# Patient Record
Sex: MALE | Race: WHITE | Employment: OTHER | URBAN - METROPOLITAN AREA
[De-identification: names, ages, dates, MRNs, and addresses within clinical notes are randomized per-mention and may not be internally consistent; named-entity substitution may affect disease eponyms.]

---

## 2017-01-05 ENCOUNTER — COMMUNICATION - HEALTHEAST (OUTPATIENT)
Dept: INTERNAL MEDICINE | Facility: CLINIC | Age: 72
End: 2017-01-05

## 2017-01-06 ENCOUNTER — COMMUNICATION - HEALTHEAST (OUTPATIENT)
Dept: INTERNAL MEDICINE | Facility: CLINIC | Age: 72
End: 2017-01-06

## 2017-01-12 ENCOUNTER — RECORDS - HEALTHEAST (OUTPATIENT)
Dept: ADMINISTRATIVE | Facility: OTHER | Age: 72
End: 2017-01-12

## 2017-01-20 ENCOUNTER — RECORDS - HEALTHEAST (OUTPATIENT)
Dept: ADMINISTRATIVE | Facility: OTHER | Age: 72
End: 2017-01-20

## 2017-01-26 ENCOUNTER — RECORDS - HEALTHEAST (OUTPATIENT)
Dept: ADMINISTRATIVE | Facility: OTHER | Age: 72
End: 2017-01-26

## 2017-02-03 ENCOUNTER — OFFICE VISIT - HEALTHEAST (OUTPATIENT)
Dept: INTERNAL MEDICINE | Facility: CLINIC | Age: 72
End: 2017-02-03

## 2017-02-03 ENCOUNTER — COMMUNICATION - HEALTHEAST (OUTPATIENT)
Dept: INTERNAL MEDICINE | Facility: CLINIC | Age: 72
End: 2017-02-03

## 2017-02-03 DIAGNOSIS — C71.9 MALIGNANT NEOPLASM OF BRAIN, UNSPECIFIED LOCATION (H): ICD-10-CM

## 2017-02-03 DIAGNOSIS — N40.1 BPH (BENIGN PROSTATIC HYPERTROPHY) WITH URINARY OBSTRUCTION: ICD-10-CM

## 2017-02-03 DIAGNOSIS — G40.909 EPILEPSY (H): ICD-10-CM

## 2017-02-03 DIAGNOSIS — N17.1 ACUTE RENAL FAILURE WITH ACUTE CORTICAL NECROSIS (H): ICD-10-CM

## 2017-02-03 DIAGNOSIS — R73.09 OTHER ABNORMAL GLUCOSE: ICD-10-CM

## 2017-02-03 DIAGNOSIS — J98.59 MEDIASTINAL MASS: ICD-10-CM

## 2017-02-03 DIAGNOSIS — I10 ESSENTIAL HYPERTENSION WITH GOAL BLOOD PRESSURE LESS THAN 140/90: ICD-10-CM

## 2017-02-03 DIAGNOSIS — J44.89 CHRONIC AIRWAY OBSTRUCTION, NOT ELSEWHERE CLASSIFIED: ICD-10-CM

## 2017-02-03 DIAGNOSIS — I87.2 VENOUS INSUFFICIENCY: ICD-10-CM

## 2017-02-03 DIAGNOSIS — N13.8 BPH (BENIGN PROSTATIC HYPERTROPHY) WITH URINARY OBSTRUCTION: ICD-10-CM

## 2017-02-03 DIAGNOSIS — C34.91 NON-SMALL CELL LUNG CANCER, RIGHT (H): ICD-10-CM

## 2017-02-03 ASSESSMENT — MIFFLIN-ST. JEOR: SCORE: 1593.09

## 2017-02-10 ENCOUNTER — RECORDS - HEALTHEAST (OUTPATIENT)
Dept: ADMINISTRATIVE | Facility: OTHER | Age: 72
End: 2017-02-10

## 2017-02-13 ENCOUNTER — RECORDS - HEALTHEAST (OUTPATIENT)
Dept: ADMINISTRATIVE | Facility: OTHER | Age: 72
End: 2017-02-13

## 2017-02-20 ENCOUNTER — RECORDS - HEALTHEAST (OUTPATIENT)
Dept: ADMINISTRATIVE | Facility: OTHER | Age: 72
End: 2017-02-20

## 2017-02-22 ENCOUNTER — RECORDS - HEALTHEAST (OUTPATIENT)
Dept: ADMINISTRATIVE | Facility: OTHER | Age: 72
End: 2017-02-22

## 2017-03-01 ENCOUNTER — RECORDS - HEALTHEAST (OUTPATIENT)
Dept: ADMINISTRATIVE | Facility: OTHER | Age: 72
End: 2017-03-01

## 2017-03-02 ENCOUNTER — RECORDS - HEALTHEAST (OUTPATIENT)
Dept: ADMINISTRATIVE | Facility: OTHER | Age: 72
End: 2017-03-02

## 2017-03-08 ENCOUNTER — RECORDS - HEALTHEAST (OUTPATIENT)
Dept: ADMINISTRATIVE | Facility: OTHER | Age: 72
End: 2017-03-08

## 2017-05-20 ENCOUNTER — RECORDS - HEALTHEAST (OUTPATIENT)
Dept: ADMINISTRATIVE | Facility: OTHER | Age: 72
End: 2017-05-20

## 2017-05-31 ENCOUNTER — COMMUNICATION - HEALTHEAST (OUTPATIENT)
Dept: INTERNAL MEDICINE | Facility: CLINIC | Age: 72
End: 2017-05-31

## 2017-06-01 ENCOUNTER — OFFICE VISIT - HEALTHEAST (OUTPATIENT)
Dept: INTERNAL MEDICINE | Facility: CLINIC | Age: 72
End: 2017-06-01

## 2017-06-01 DIAGNOSIS — T63.301A SPIDER BITE: ICD-10-CM

## 2017-06-01 ASSESSMENT — MIFFLIN-ST. JEOR: SCORE: 1597.62

## 2017-06-07 ENCOUNTER — RECORDS - HEALTHEAST (OUTPATIENT)
Dept: ADMINISTRATIVE | Facility: OTHER | Age: 72
End: 2017-06-07

## 2017-06-22 ENCOUNTER — COMMUNICATION - HEALTHEAST (OUTPATIENT)
Dept: INTERNAL MEDICINE | Facility: CLINIC | Age: 72
End: 2017-06-22

## 2017-06-26 ENCOUNTER — RECORDS - HEALTHEAST (OUTPATIENT)
Dept: ADMINISTRATIVE | Facility: OTHER | Age: 72
End: 2017-06-26

## 2017-07-30 ENCOUNTER — RECORDS - HEALTHEAST (OUTPATIENT)
Dept: ADMINISTRATIVE | Facility: OTHER | Age: 72
End: 2017-07-30

## 2017-08-10 ENCOUNTER — RECORDS - HEALTHEAST (OUTPATIENT)
Dept: ADMINISTRATIVE | Facility: OTHER | Age: 72
End: 2017-08-10

## 2017-10-18 ENCOUNTER — RECORDS - HEALTHEAST (OUTPATIENT)
Dept: ADMINISTRATIVE | Facility: OTHER | Age: 72
End: 2017-10-18

## 2017-10-25 ENCOUNTER — RECORDS - HEALTHEAST (OUTPATIENT)
Dept: ADMINISTRATIVE | Facility: OTHER | Age: 72
End: 2017-10-25

## 2017-10-26 ENCOUNTER — RECORDS - HEALTHEAST (OUTPATIENT)
Dept: ADMINISTRATIVE | Facility: OTHER | Age: 72
End: 2017-10-26

## 2017-10-31 ENCOUNTER — RECORDS - HEALTHEAST (OUTPATIENT)
Dept: ADMINISTRATIVE | Facility: OTHER | Age: 72
End: 2017-10-31

## 2018-01-30 ENCOUNTER — OFFICE VISIT - HEALTHEAST (OUTPATIENT)
Dept: INTERNAL MEDICINE | Facility: CLINIC | Age: 73
End: 2018-01-30

## 2018-01-30 DIAGNOSIS — N62 GYNECOMASTIA, MALE: ICD-10-CM

## 2018-01-30 ASSESSMENT — MIFFLIN-ST. JEOR: SCORE: 1597.62

## 2018-02-06 ENCOUNTER — COMMUNICATION - HEALTHEAST (OUTPATIENT)
Dept: INTERNAL MEDICINE | Facility: CLINIC | Age: 73
End: 2018-02-06

## 2018-02-06 ENCOUNTER — COMMUNICATION - HEALTHEAST (OUTPATIENT)
Dept: SCHEDULING | Facility: CLINIC | Age: 73
End: 2018-02-06

## 2018-03-28 ENCOUNTER — RECORDS - HEALTHEAST (OUTPATIENT)
Dept: ADMINISTRATIVE | Facility: OTHER | Age: 73
End: 2018-03-28

## 2018-08-06 ENCOUNTER — COMMUNICATION - HEALTHEAST (OUTPATIENT)
Dept: INTERNAL MEDICINE | Facility: CLINIC | Age: 73
End: 2018-08-06

## 2018-08-13 ENCOUNTER — COMMUNICATION - HEALTHEAST (OUTPATIENT)
Dept: INTERNAL MEDICINE | Facility: CLINIC | Age: 73
End: 2018-08-13

## 2018-09-05 ENCOUNTER — RECORDS - HEALTHEAST (OUTPATIENT)
Dept: ADMINISTRATIVE | Facility: OTHER | Age: 73
End: 2018-09-05

## 2018-09-12 ENCOUNTER — RECORDS - HEALTHEAST (OUTPATIENT)
Dept: ADMINISTRATIVE | Facility: OTHER | Age: 73
End: 2018-09-12

## 2018-10-30 ENCOUNTER — RECORDS - HEALTHEAST (OUTPATIENT)
Dept: ADMINISTRATIVE | Facility: OTHER | Age: 73
End: 2018-10-30

## 2019-02-21 ENCOUNTER — RECORDS - HEALTHEAST (OUTPATIENT)
Dept: ADMINISTRATIVE | Facility: OTHER | Age: 74
End: 2019-02-21

## 2019-03-13 ENCOUNTER — RECORDS - HEALTHEAST (OUTPATIENT)
Dept: ADMINISTRATIVE | Facility: OTHER | Age: 74
End: 2019-03-13

## 2019-04-29 ENCOUNTER — RECORDS - HEALTHEAST (OUTPATIENT)
Dept: ADMINISTRATIVE | Facility: OTHER | Age: 74
End: 2019-04-29

## 2019-05-07 ENCOUNTER — RECORDS - HEALTHEAST (OUTPATIENT)
Dept: ADMINISTRATIVE | Facility: OTHER | Age: 74
End: 2019-05-07

## 2019-06-03 ENCOUNTER — RECORDS - HEALTHEAST (OUTPATIENT)
Dept: ADMINISTRATIVE | Facility: OTHER | Age: 74
End: 2019-06-03

## 2019-06-03 ENCOUNTER — OFFICE VISIT - HEALTHEAST (OUTPATIENT)
Dept: INTERNAL MEDICINE | Facility: CLINIC | Age: 74
End: 2019-06-03

## 2019-06-03 ENCOUNTER — COMMUNICATION - HEALTHEAST (OUTPATIENT)
Dept: INTERNAL MEDICINE | Facility: CLINIC | Age: 74
End: 2019-06-03

## 2019-06-03 DIAGNOSIS — Z12.5 SCREENING FOR PROSTATE CANCER: ICD-10-CM

## 2019-06-03 DIAGNOSIS — Z00.00 ROUTINE GENERAL MEDICAL EXAMINATION AT A HEALTH CARE FACILITY: ICD-10-CM

## 2019-06-03 DIAGNOSIS — M85.9 DISORDER OF BONE DENSITY AND STRUCTURE, UNSPECIFIED: ICD-10-CM

## 2019-06-03 DIAGNOSIS — E83.52 HYPERCALCEMIA: ICD-10-CM

## 2019-06-03 DIAGNOSIS — M89.9 DISORDER OF BONE: ICD-10-CM

## 2019-06-03 DIAGNOSIS — E78.00 PURE HYPERCHOLESTEROLEMIA: ICD-10-CM

## 2019-06-03 DIAGNOSIS — C34.91 NON-SMALL CELL LUNG CANCER, RIGHT (H): ICD-10-CM

## 2019-06-03 DIAGNOSIS — J40 BRONCHITIS, NOT SPECIFIED AS ACUTE OR CHRONIC: ICD-10-CM

## 2019-06-03 DIAGNOSIS — J44.1 OBSTRUCTIVE CHRONIC BRONCHITIS WITH EXACERBATION (H): ICD-10-CM

## 2019-06-03 DIAGNOSIS — E78.00 HYPERCHOLESTEREMIA: ICD-10-CM

## 2019-06-03 DIAGNOSIS — M85.80 OSTEOPENIA, UNSPECIFIED LOCATION: ICD-10-CM

## 2019-06-03 LAB
ALBUMIN SERPL-MCNC: 4.4 G/DL (ref 3.5–5)
ALP SERPL-CCNC: 56 U/L (ref 45–120)
ALT SERPL W P-5'-P-CCNC: 21 U/L (ref 0–45)
ANION GAP SERPL CALCULATED.3IONS-SCNC: 10 MMOL/L (ref 5–18)
AST SERPL W P-5'-P-CCNC: 19 U/L (ref 0–40)
BILIRUB SERPL-MCNC: 0.4 MG/DL (ref 0–1)
BUN SERPL-MCNC: 17 MG/DL (ref 8–28)
CALCIUM SERPL-MCNC: 9.8 MG/DL (ref 8.5–10.5)
CHLORIDE BLD-SCNC: 101 MMOL/L (ref 98–107)
CHOLEST SERPL-MCNC: 191 MG/DL
CO2 SERPL-SCNC: 29 MMOL/L (ref 22–31)
CREAT SERPL-MCNC: 0.85 MG/DL (ref 0.7–1.3)
ERYTHROCYTE [DISTWIDTH] IN BLOOD BY AUTOMATED COUNT: 13.1 % (ref 11–14.5)
FASTING STATUS PATIENT QL REPORTED: YES
GFR SERPL CREATININE-BSD FRML MDRD: >60 ML/MIN/1.73M2
GLUCOSE BLD-MCNC: 98 MG/DL (ref 70–125)
HCT VFR BLD AUTO: 47.6 % (ref 40–54)
HDLC SERPL-MCNC: 65 MG/DL
HGB BLD-MCNC: 15.4 G/DL (ref 14–18)
LDLC SERPL CALC-MCNC: 100 MG/DL
MCH RBC QN AUTO: 26.5 PG (ref 27–34)
MCHC RBC AUTO-ENTMCNC: 32.3 G/DL (ref 32–36)
MCV RBC AUTO: 82 FL (ref 80–100)
PLATELET # BLD AUTO: 275 THOU/UL (ref 140–440)
PMV BLD AUTO: 9.1 FL (ref 7–10)
POTASSIUM BLD-SCNC: 4.2 MMOL/L (ref 3.5–5)
PROT SERPL-MCNC: 7.3 G/DL (ref 6–8)
PSA SERPL-MCNC: 0.8 NG/ML (ref 0–6.5)
RBC # BLD AUTO: 5.79 MILL/UL (ref 4.4–6.2)
SODIUM SERPL-SCNC: 140 MMOL/L (ref 136–145)
TRIGL SERPL-MCNC: 132 MG/DL
WBC: 8.4 THOU/UL (ref 4–11)

## 2019-06-03 ASSESSMENT — MIFFLIN-ST. JEOR: SCORE: 1584.2

## 2019-06-12 ENCOUNTER — RECORDS - HEALTHEAST (OUTPATIENT)
Dept: ADMINISTRATIVE | Facility: OTHER | Age: 74
End: 2019-06-12

## 2019-06-12 ENCOUNTER — RECORDS - HEALTHEAST (OUTPATIENT)
Dept: BONE DENSITY | Facility: CLINIC | Age: 74
End: 2019-06-12

## 2019-06-12 DIAGNOSIS — M85.9 DISORDER OF BONE DENSITY AND STRUCTURE, UNSPECIFIED: ICD-10-CM

## 2019-06-12 DIAGNOSIS — M89.9 DISORDER OF BONE, UNSPECIFIED: ICD-10-CM

## 2019-06-12 DIAGNOSIS — M85.80 OTHER SPECIFIED DISORDERS OF BONE DENSITY AND STRUCTURE, UNSPECIFIED SITE: ICD-10-CM

## 2019-06-17 ENCOUNTER — RECORDS - HEALTHEAST (OUTPATIENT)
Dept: ADMINISTRATIVE | Facility: OTHER | Age: 74
End: 2019-06-17

## 2019-07-01 ENCOUNTER — AMBULATORY - HEALTHEAST (OUTPATIENT)
Dept: INTERNAL MEDICINE | Facility: CLINIC | Age: 74
End: 2019-07-01

## 2019-07-01 ENCOUNTER — COMMUNICATION - HEALTHEAST (OUTPATIENT)
Dept: INTERNAL MEDICINE | Facility: CLINIC | Age: 74
End: 2019-07-01

## 2019-07-01 DIAGNOSIS — M81.0 AGE-RELATED OSTEOPOROSIS WITHOUT CURRENT PATHOLOGICAL FRACTURE: ICD-10-CM

## 2019-07-10 ENCOUNTER — RECORDS - HEALTHEAST (OUTPATIENT)
Dept: ADMINISTRATIVE | Facility: OTHER | Age: 74
End: 2019-07-10

## 2019-07-10 LAB — COLOGUARD-ABSTRACT: NEGATIVE

## 2019-07-14 ENCOUNTER — RECORDS - HEALTHEAST (OUTPATIENT)
Dept: ADMINISTRATIVE | Facility: OTHER | Age: 74
End: 2019-07-14

## 2019-07-16 ENCOUNTER — RECORDS - HEALTHEAST (OUTPATIENT)
Dept: ADMINISTRATIVE | Facility: OTHER | Age: 74
End: 2019-07-16

## 2019-07-25 ENCOUNTER — RECORDS - HEALTHEAST (OUTPATIENT)
Dept: HEALTH INFORMATION MANAGEMENT | Facility: CLINIC | Age: 74
End: 2019-07-25

## 2019-07-25 ENCOUNTER — COMMUNICATION - HEALTHEAST (OUTPATIENT)
Dept: INTERNAL MEDICINE | Facility: CLINIC | Age: 74
End: 2019-07-25

## 2019-08-07 ENCOUNTER — RECORDS - HEALTHEAST (OUTPATIENT)
Dept: ADMINISTRATIVE | Facility: OTHER | Age: 74
End: 2019-08-07

## 2019-08-08 ENCOUNTER — TRANSFERRED RECORDS (OUTPATIENT)
Dept: HEALTH INFORMATION MANAGEMENT | Facility: CLINIC | Age: 74
End: 2019-08-08

## 2019-09-17 ENCOUNTER — RECORDS - HEALTHEAST (OUTPATIENT)
Dept: ADMINISTRATIVE | Facility: OTHER | Age: 74
End: 2019-09-17

## 2019-09-25 ENCOUNTER — RECORDS - HEALTHEAST (OUTPATIENT)
Dept: ADMINISTRATIVE | Facility: OTHER | Age: 74
End: 2019-09-25

## 2019-11-19 ENCOUNTER — OFFICE VISIT - HEALTHEAST (OUTPATIENT)
Dept: INTERNAL MEDICINE | Facility: CLINIC | Age: 74
End: 2019-11-19

## 2019-11-19 DIAGNOSIS — I10 ESSENTIAL HYPERTENSION: ICD-10-CM

## 2019-11-19 DIAGNOSIS — C71.5: ICD-10-CM

## 2019-11-19 DIAGNOSIS — Z00.00 ROUTINE GENERAL MEDICAL EXAMINATION AT A HEALTH CARE FACILITY: ICD-10-CM

## 2019-11-19 DIAGNOSIS — M85.80 OSTEOPENIA, UNSPECIFIED LOCATION: ICD-10-CM

## 2019-11-19 DIAGNOSIS — Z79.899 MEDICATION MANAGEMENT: ICD-10-CM

## 2019-11-19 DIAGNOSIS — J44.1 OBSTRUCTIVE CHRONIC BRONCHITIS WITH EXACERBATION (H): ICD-10-CM

## 2019-11-19 DIAGNOSIS — M81.0 AGE-RELATED OSTEOPOROSIS WITHOUT CURRENT PATHOLOGICAL FRACTURE: ICD-10-CM

## 2019-11-19 DIAGNOSIS — C34.91 NON-SMALL CELL LUNG CANCER, RIGHT (H): ICD-10-CM

## 2019-11-19 DIAGNOSIS — M85.9 DISORDER OF BONE DENSITY AND STRUCTURE, UNSPECIFIED: ICD-10-CM

## 2019-11-19 DIAGNOSIS — E78.00 PURE HYPERCHOLESTEROLEMIA: ICD-10-CM

## 2019-11-19 LAB
ALBUMIN SERPL-MCNC: 3.9 G/DL (ref 3.5–5)
ALP SERPL-CCNC: 49 U/L (ref 45–120)
ALT SERPL W P-5'-P-CCNC: 21 U/L (ref 0–45)
ANION GAP SERPL CALCULATED.3IONS-SCNC: 8 MMOL/L (ref 5–18)
AST SERPL W P-5'-P-CCNC: 17 U/L (ref 0–40)
BILIRUB SERPL-MCNC: 0.3 MG/DL (ref 0–1)
BUN SERPL-MCNC: 25 MG/DL (ref 8–28)
CALCIUM SERPL-MCNC: 9.1 MG/DL (ref 8.5–10.5)
CARBAMAZEPINE SERPL-MCNC: 7.1 UG/ML (ref 4–12)
CHLORIDE BLD-SCNC: 101 MMOL/L (ref 98–107)
CHOLEST SERPL-MCNC: 170 MG/DL
CO2 SERPL-SCNC: 31 MMOL/L (ref 22–31)
CREAT SERPL-MCNC: 0.94 MG/DL (ref 0.7–1.3)
ERYTHROCYTE [DISTWIDTH] IN BLOOD BY AUTOMATED COUNT: 12.7 % (ref 11–14.5)
FASTING STATUS PATIENT QL REPORTED: NO
GFR SERPL CREATININE-BSD FRML MDRD: >60 ML/MIN/1.73M2
GLUCOSE BLD-MCNC: 102 MG/DL (ref 70–125)
HCT VFR BLD AUTO: 45.1 % (ref 40–54)
HDLC SERPL-MCNC: 57 MG/DL
HGB BLD-MCNC: 14.7 G/DL (ref 14–18)
LDLC SERPL CALC-MCNC: 83 MG/DL
MCH RBC QN AUTO: 28.5 PG (ref 27–34)
MCHC RBC AUTO-ENTMCNC: 32.5 G/DL (ref 32–36)
MCV RBC AUTO: 88 FL (ref 80–100)
PLATELET # BLD AUTO: 300 THOU/UL (ref 140–440)
PMV BLD AUTO: 8.5 FL (ref 7–10)
POTASSIUM BLD-SCNC: 4.8 MMOL/L (ref 3.5–5)
PROT SERPL-MCNC: 6.9 G/DL (ref 6–8)
RBC # BLD AUTO: 5.14 MILL/UL (ref 4.4–6.2)
SODIUM SERPL-SCNC: 140 MMOL/L (ref 136–145)
TRIGL SERPL-MCNC: 148 MG/DL
WBC: 8.6 THOU/UL (ref 4–11)

## 2019-11-19 ASSESSMENT — MIFFLIN-ST. JEOR: SCORE: 1593.09

## 2019-11-21 ENCOUNTER — COMMUNICATION - HEALTHEAST (OUTPATIENT)
Dept: INTERNAL MEDICINE | Facility: CLINIC | Age: 74
End: 2019-11-21

## 2020-02-10 ENCOUNTER — RECORDS - HEALTHEAST (OUTPATIENT)
Dept: ADMINISTRATIVE | Facility: OTHER | Age: 75
End: 2020-02-10

## 2020-09-25 ENCOUNTER — RECORDS - HEALTHEAST (OUTPATIENT)
Dept: ADMINISTRATIVE | Facility: OTHER | Age: 75
End: 2020-09-25

## 2021-01-07 ENCOUNTER — TRANSFERRED RECORDS (OUTPATIENT)
Dept: HEALTH INFORMATION MANAGEMENT | Facility: CLINIC | Age: 76
End: 2021-01-07

## 2021-01-08 ENCOUNTER — TRANSFERRED RECORDS (OUTPATIENT)
Dept: HEALTH INFORMATION MANAGEMENT | Facility: CLINIC | Age: 76
End: 2021-01-08

## 2021-01-08 ENCOUNTER — MEDICAL CORRESPONDENCE (OUTPATIENT)
Dept: HEALTH INFORMATION MANAGEMENT | Facility: CLINIC | Age: 76
End: 2021-01-08

## 2021-01-11 ENCOUNTER — TRANSCRIBE ORDERS (OUTPATIENT)
Dept: OTHER | Age: 76
End: 2021-01-11

## 2021-01-11 DIAGNOSIS — D32.0 CEREBRAL MENINGIOMA (H): Primary | ICD-10-CM

## 2021-01-12 ENCOUNTER — TELEPHONE (OUTPATIENT)
Dept: OPHTHALMOLOGY | Facility: CLINIC | Age: 76
End: 2021-01-12

## 2021-01-12 NOTE — TELEPHONE ENCOUNTER
Spoke to pt/wife at 0900  No new/sudden vision changes    MRI done at [place] couple weeks ago.    Scheduled with Dr. Rodriguez first available 2-2-2021 at [facility] location     Pt/aware of date/time/location at [facility]    Note to facilitator    Kamaljit Camara RN 9:20 AM 01/13/21        Nadeem Rosas [phone]      Left message with direct number at [phone]    Kamaljit Camara RN 3:55 PM 01/12/21        [facility]    Phone Message    May a detailed message be left on voicemail: yes     Reason for Call: Appointment Intake    Referring Provider Name: Lucille Solis NP at [facility]   Diagnosis and/or Symptoms: Cerebral Meningioma     Records in care everywhere, please review and contact patient with appointment options, Dx not on scheduling protocols    Action Taken: Message routed to:  [facility]    Travel Screening: Not Applicable

## 2021-02-02 ENCOUNTER — OFFICE VISIT (OUTPATIENT)
Dept: OPHTHALMOLOGY | Facility: CLINIC | Age: 76
End: 2021-02-02
Attending: OPHTHALMOLOGY
Payer: MEDICARE

## 2021-02-02 DIAGNOSIS — H53.40 VISUAL FIELD DEFECT: Primary | ICD-10-CM

## 2021-02-02 DIAGNOSIS — D32.9 MENINGIOMA (H): ICD-10-CM

## 2021-02-02 DIAGNOSIS — H53.40 VISUAL FIELD DEFECT: ICD-10-CM

## 2021-02-02 DIAGNOSIS — H46.9 OPTIC NEUROPATHY: Primary | ICD-10-CM

## 2021-02-02 PROCEDURE — 92083 EXTENDED VISUAL FIELD XM: CPT | Performed by: OPHTHALMOLOGY

## 2021-02-02 PROCEDURE — 99204 OFFICE O/P NEW MOD 45 MIN: CPT | Mod: GC | Performed by: OPHTHALMOLOGY

## 2021-02-02 PROCEDURE — G0463 HOSPITAL OUTPT CLINIC VISIT: HCPCS | Performed by: TECHNICIAN/TECHNOLOGIST

## 2021-02-02 PROCEDURE — 92133 CPTRZD OPH DX IMG PST SGM ON: CPT | Performed by: OPHTHALMOLOGY

## 2021-02-02 ASSESSMENT — REFRACTION_WEARINGRX
SPECS_TYPE: BIFOCAL
OS_ADD: +2.50
OD_CYLINDER: +1.50
OD_SPHERE: -1.25
OS_SPHERE: -0.75
OD_ADD: +2.50
OS_CYLINDER: +1.25
OS_AXIS: 150
OD_AXIS: 028

## 2021-02-02 ASSESSMENT — VISUAL ACUITY
OD_CC+: -2
METHOD: SNELLEN - LINEAR
OD_CC: 20/25
CORRECTION_TYPE: GLASSES
OS_CC: 20/25
OS_CC+: -3

## 2021-02-02 ASSESSMENT — EXTERNAL EXAM - LEFT EYE: OS_EXAM: NORMAL

## 2021-02-02 ASSESSMENT — CONF VISUAL FIELD
OS_INFERIOR_TEMPORAL_RESTRICTION: 3
METHOD: COUNTING FINGERS
OD_NORMAL: 1

## 2021-02-02 ASSESSMENT — TONOMETRY
IOP_METHOD: ICARE
OS_IOP_MMHG: 20
OD_IOP_MMHG: 20

## 2021-02-02 ASSESSMENT — SLIT LAMP EXAM - LIDS
COMMENTS: NORMAL
COMMENTS: NORMAL

## 2021-02-02 ASSESSMENT — EXTERNAL EXAM - RIGHT EYE: OD_EXAM: NORMAL

## 2021-02-02 NOTE — LETTER
2021         RE:  :  MRN: Nadeem Rosas  1945  [id number]     Dear Dr. Nicole,    Thank you for asking me to see your very pleasant patient, Nadeem Rosas, in neuro-ophthalmic consultation.  I would like to thank you for sending your records and I have summarized them in the history of present illness. He presented with his spouse who provided additional history.  My assessment and plan are below.  For further details, please see my attached clinic note.      Assessment & Plan     Nadeem Rosas is a 75 year old male with the following diagnoses:   1. Optic neuropathy    2. Visual field defect    3. Meningioma (H)         Patient was sent for consultation by Dr. Brenda Nicole for meningioma.       Nadeem Rosas is a 75 year old male with a history of posterior fossa ependymoma s/p resection by Dr. 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 He was admitted for left submandiular infection associated with dental abscess at [facility]. MRI brain was obtained which incidentally noted a right paraclinoid meningioma. His most recent MRI brain w/wo contrast on 21 showed interval increase in size of the meningioma.    The patient reports that he has not noticed any problems with his vision over the past year. He is happy with his current pair of glasses. He denies any headaches, nausea, vomiting.     He has chronic neck pain with parathesias that radiates into both arms (L > R). He takes Tylenol often for the pain which helps.       POH: CEIOL OU ()  Negative for glaucoma or macular degeneration     PMH: posterior fossa ependymoma s/p resection by Dr. Schwab in   Posterior circulation stroke  with chronic inferotemporal quadrantanopia  Adenocarcinoma of the lung s/p primary resection followed by local  radiation 3/2016, repeat radiation in 12/2020 for recurrence  Hypertension on losartan  Hypercholesterolemia  Seizures - takes carbamazepine     PSH:   ependymoma resection  Left lung decortication in 1995    Negative for diabetes     SHx: former smoker (quit >25 years ago). No alcohol use. No illicit drug use.     MRI 1/7/21   1.  Mild interval increase in size of the right planum sphenoidale/tuberculum sellae meningioma.  2.  Punctate focus of enhancement in the lateral right cerebellar folia nonspecific is slightly more conspicuous on the 07/14/2019 MRI. Recommend continued attention on follow-up.  3.  Nodular enhancement in the right posterior fossa lateral adjacent to the medulla is unchanged dating back to 07/14/2019 and favored to represent asymmetric choroid plexus.    Visual acuity 20/25 both eyes.  Pupils with trace afferent pupillary defect right eye.  Color vision normal both eyes.  Posterior chamber intraocular lens (PCIOL) in both eyes.  Fundus exam with right+ pallor RIGHT eye and temporal pallor left eye.      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  I reviewed his MRI images personally and there is clear compression of the right optic nerve.  He has evidence of an optic neuropathy RIGHT eye with a right afferent pupillary defect, right retinal nerve fiber layer thinning, and right ganglion cell layer loss.  I will let Dr. Nicole know so that she can discuss radiation therapy with him.  Follow up 1 year sooner as needed for worsening symptoms.     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            Again, thank you for allowing me to participate in the care of your patient.      Sincerely,    Americo Rodriguez MD  Professor  Ophthalmology Residency   Director of Neuro-Ophthalmology  [employer] [facility] [employer] Endowed Chair  Departments of Ophthalmology, Neurology, and  Neurosurgery  [facility] [address] [place]  [address]  T - [phone]  F - [phone]  KISHA Chavez [email or web address]      CC: Brenda Nicole MD  [facility]  [address] [place] [address]  Via Fax: [phone]     Milan Whipple MD  [facility]  [address] [address]  Via Outside Provider Messaging    DX = optic atrophy, meningioma, homonymous hemianopia

## 2021-02-02 NOTE — PROGRESS NOTES
Assessment & Plan     Nadeem Rosas is a 75 year old male with the following diagnoses:   1. Optic neuropathy    2. Visual field defect    3. Meningioma (H)         Patient was sent for consultation by Dr. Brenda Nicole for meningioma.       Nadeem Rosas is a 75 year old male with a history of posterior fossa ependymoma s/p resection by Dr. Schwab (1991), posterior circulation stroke (1991) with chronic inferotemporal quadrantanopia, adenocarcinoma of the lung s/p primary resection followed by local radiation 3/2016, repeat radiation in 12/2020 for recurrence. He was admitted for left submandiular infection associated with dental abscess at [facility]. MRI brain was obtained which incidentally noted a right paraclinoid meningioma. His most recent MRI brain w/wo contrast on 1/7/21 showed interval increase in size of the meningioma.    The patient reports that he has not noticed any problems with his vision over the past year. He is happy with his current pair of glasses. He denies any headaches, nausea, vomiting.     He has chronic neck pain with parathesias that radiates into both arms (L > R). He takes Tylenol often for the pain which helps.       POH: CEIOL OU (2012)  Negative for glaucoma or macular degeneration     PMH: posterior fossa ependymoma s/p resection by Dr. Schwab in 1991  Posterior circulation stroke 1991 with chronic inferotemporal quadrantanopia  Adenocarcinoma of the lung s/p primary resection followed by local radiation 3/2016, repeat radiation in 12/2020 for recurrence  Hypertension on losartan  Hypercholesterolemia  Seizures - takes carbamazepine     PSH:   ependymoma resection  Left lung decortication in 1995    Negative for diabetes     SHx: former smoker (quit >25 years ago). No alcohol use. No illicit drug use.     MRI 1/7/21   1.  Mild interval increase in size of the right planum sphenoidale/tuberculum sellae meningioma.  2.  Punctate focus of enhancement in the  lateral right cerebellar folia nonspecific is slightly more conspicuous on the 07/14/2019 MRI. Recommend continued attention on follow-up.  3.  Nodular enhancement in the right posterior fossa lateral adjacent to the medulla is unchanged dating back to 07/14/2019 and favored to represent asymmetric choroid plexus.    Visual acuity 20/25 both eyes.  Pupils with trace afferent pupillary defect right eye.  Color vision normal both eyes.  Posterior chamber intraocular lens (PCIOL) in both eyes.  Fundus exam with right+ pallor RIGHT eye and temporal pallor left eye.      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  I reviewed his MRI images personally and there is clear compression of the right optic nerve.  He has evidence of an optic neuropathy RIGHT eye with a right afferent pupillary defect, right retinal nerve fiber layer thinning, and right ganglion cell layer loss.  I will let Dr. Nicole know so that she can discuss radiation therapy with him.  Follow up 1 year sooner as needed for worsening symptoms.     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              Attending Physician Attestation:  Complete documentation of historical and exam elements from today's encounter can be found in the full encounter summary report (not reduplicated in this progress note).  I personally obtained the chief complaint(s) and history of present illness.  I confirmed and edited as necessary the review of systems, past medical/surgical history, family history, social history, and examination findings as documented by others; and I examined the patient myself.  I personally reviewed the relevant tests, images, and reports as documented above.  I formulated and edited as necessary the assessment and plan and discussed the findings and management plan with the patient and family. I personally reviewed the ophthalmic test(s)  associated with this encounter, agree with the interpretation(s) as documented by the resident/fellow, and have edited the corresponding report(s) as necessary.  - Americo Welch MD  Ophthalmology PGY-3  [facility]

## 2021-02-02 NOTE — Clinical Note
2/2/2021       RE: Nadeem Rosas  [address]  [facility] [address]     Dear Colleague,    Thank you for referring your patient, Nadeem Rosas, to the [facility] at [facility]. Please see a copy of my visit note below.         Assessment & Plan     Nadeem Rosas is a 75 year old male with the following diagnoses:   1. Optic neuropathy    2. Visual field defect    3. Meningioma (H)         Patient was sent for consultation by Dr. Brenda Nicole for meningioma.       Nadeem Rosas is a 75 year old male with a history of posterior fossa ependymoma s/p resection by Dr. Schwab (1991), posterior circulation stroke (1991) with chronic inferotemporal quadrantanopia, adenocarcinoma of the lung s/p primary resection followed by local radiation 3/2016, repeat radiation in 12/2020 for recurrence. He was admitted for left submandiular infection associated with dental abscess at [facility]. MRI brain was obtained which incidentally noted a right paraclinoid meningioma. His most recent MRI brain w/wo contrast on 1/7/21 showed interval increase in size of the meningioma.    The patient reports that he has not noticed any problems with his vision over the past year. He is happy with his current pair of glasses. He denies any headaches, nausea, vomiting.     He has chronic neck pain with parathesias that radiates into both arms (L > R). He takes Tylenol often for the pain which helps.       POH: CEIOL OU (2012)  Negative for glaucoma or macular degeneration     PMH: posterior fossa ependymoma s/p resection by Dr. Schwab in 1991  Posterior circulation stroke 1991 with chronic inferotemporal quadrantanopia  Adenocarcinoma of the lung s/p primary resection followed by local radiation 3/2016, repeat radiation in 12/2020 for recurrence  Hypertension on losartan  Hypercholesterolemia  Seizures - takes carbamazepine     PSH:   ependymoma resection  Left lung  decortication in 1995    Negative for diabetes     SHx: former smoker (quit >25 years ago). No alcohol use. No illicit drug use.     MRI 1/7/21   1.  Mild interval increase in size of the right planum sphenoidale/tuberculum sellae meningioma.  2.  Punctate focus of enhancement in the lateral right cerebellar folia nonspecific is slightly more conspicuous on the 07/14/2019 MRI. Recommend continued attention on follow-up.  3.  Nodular enhancement in the right posterior fossa lateral adjacent to the medulla is unchanged dating back to 07/14/2019 and favored to represent asymmetric choroid plexus.    Visual acuity 20/25 both eyes.  Pupils with trace afferent pupillary defect right eye.  Color vision normal both eyes.  Posterior chamber intraocular lens (PCIOL) in both eyes.  Fundus exam with right+ pallor RIGHT eye and temporal pallor left eye.      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  I reviewed his MRI images personally and there is clear compression of the right optic nerve.  He has evidence of an optic neuropathy RIGHT eye with a right afferent pupillary defect, right retinal nerve fiber layer thinning, and right ganglion cell layer loss.  I will let Dr. Nicole know so that she can discuss radiation therapy with him.  Follow up 1 year sooner as needed for worsening symptoms.     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              Attending Physician Attestation:  Complete documentation of historical and exam elements from today's encounter can be found in the full encounter summary report (not reduplicated in this progress note).  I personally obtained the chief complaint(s) and history of present illness.  I confirmed and edited as necessary the review of systems, past medical/surgical history, family history, social history, and examination findings as documented by others; and I examined the  patient myself.  I personally reviewed the relevant tests, images, and reports as documented above.  I formulated and edited as necessary the assessment and plan and discussed the findings and management plan with the patient and family. I personally reviewed the ophthalmic test(s) associated with this encounter, agree with the interpretation(s) as documented by the resident/fellow, and have edited the corresponding report(s) as necessary.  - Americo Welch MD  Ophthalmology PGY-3  [facility]       Again, thank you for allowing me to participate in the care of your patient.      Sincerely,    Americo Rodriguez MD

## 2021-02-03 ENCOUNTER — TELEPHONE (OUTPATIENT)
Dept: OPHTHALMOLOGY | Facility: CLINIC | Age: 76
End: 2021-02-03

## 2021-05-29 NOTE — PROGRESS NOTES
Office Visit - Physical    Nadeem Rosas   73 y.o. male    Date of Visit: 6/3/2019    Chief Complaint   Patient presents with     Annual Wellness Visit     Fasting       Subjective: 89-year-old gentleman well-known to me with complex previous history which includes resection of a fourth ventricle ependymoma approximately 25 years ago.  This was complicated by a perioperative stroke.  This is left him with a mild cognitive deficit and disability he also has a history of marked COPD and non-small cell lung cancer right lung which was resected.  History of hypercalcemia osteopenia and hyperlipidemia as well as chronic bronchitis.    Current medications reviewed as noted past medical history otherwise unremarkable    Surgical history as previously described    Wellness information including health risk assessment cognitive assessment and mood assessment stable and reviewed    Advanced directives reviewed as well    Generally he feels well and has no specific complaints.  History of seizure disorder related to stroke stable [facility] [place] [facility]    Current regimen reviewed he is due for colon cancer screening and prefers to use Cologuard this is referred    History of osteopenia we are proceeding with a bone density evaluation as is recommended by his primary internist at the [facility].    Recent notes from his internist are reviewed as well previous history reviewed as well    ROS: A comprehensive review of systems was performed and was otherwise negative except as mentioned above.    Exam   Alert and oriented minimal cognitive deficit head and neck grossly negative EENT unremarkable skin and lymphatics negative abdomen obese with multiple hernia as noted in the past heart and lungs negative good breath sounds bilaterally extremities negative peripheral pulses normal neuro otherwise negative  /70 (Patient Site: Left Arm, Patient Position: Sitting, Cuff  "Size: Adult Regular)   Pulse 76   Ht 5' 6\" (1.676 m)   Wt 202 lb 0.6 oz (91.6 kg)   SpO2 99%   BMI 32.61 kg/m      Assessment and Plan    Stable wellness visit and physical exam labs pending DEXA scan and Cologuard ordered    Nadeem was seen today for annual wellness visit.    Diagnoses and all orders for this visit:    Disorder of bone density and structure, unspecified   -     DXA Bone Density Scan; Future    Routine general medical examination at a health care facility  -     losartan-hydrochlorothiazide (HYZAAR) 100-12.5 mg per tablet; Take 1 tablet by mouth daily.  -     Cologuard  -     HM2(CBC w/o Differential); Future  -     Comprehensive Metabolic Panel; Future  -     Lipid Cascade; Future  -     PSA (Prostatic-Specific Antigen), Annual Screen  -     HM2(CBC w/o Differential)  -     Comprehensive Metabolic Panel  -     Lipid Cascade    Essential Hypercholesterolemia  -     Cologuard  -     HM2(CBC w/o Differential); Future  -     Comprehensive Metabolic Panel; Future  -     Lipid Cascade; Future  -     PSA (Prostatic-Specific Antigen), Annual Screen  -     HM2(CBC w/o Differential)  -     Comprehensive Metabolic Panel  -     Lipid Cascade    Bronchitis  -     Cologuard  -     HM2(CBC w/o Differential); Future  -     Comprehensive Metabolic Panel; Future  -     Lipid Cascade; Future  -     PSA (Prostatic-Specific Antigen), Annual Screen  -     HM2(CBC w/o Differential)  -     Comprehensive Metabolic Panel  -     Lipid Cascade    Hypercholesteremia  -     Cologuard  -     HM2(CBC w/o Differential); Future  -     Comprehensive Metabolic Panel; Future  -     Lipid Cascade; Future  -     PSA (Prostatic-Specific Antigen), Annual Screen  -     HM2(CBC w/o Differential)  -     Comprehensive Metabolic Panel  -     Lipid Cascade    Obstructive chronic bronchitis with exacerbation (H)  -     Cologuard  -     HM2(CBC w/o Differential); Future  -     Comprehensive Metabolic Panel; Future  -     Lipid Cascade; " Future  -     PSA (Prostatic-Specific Antigen), Annual Screen  -     HM2(CBC w/o Differential)  -     Comprehensive Metabolic Panel  -     Lipid Cascade    Non-small cell lung cancer, right (H)  -     Cologuard  -     HM2(CBC w/o Differential); Future  -     Comprehensive Metabolic Panel; Future  -     Lipid Cascade; Future  -     PSA (Prostatic-Specific Antigen), Annual Screen  -     HM2(CBC w/o Differential)  -     Comprehensive Metabolic Panel  -     Lipid Cascade    Screening for prostate cancer  -     Cologuard  -     HM2(CBC w/o Differential); Future  -     Comprehensive Metabolic Panel; Future  -     Lipid Cascade; Future  -     PSA (Prostatic-Specific Antigen), Annual Screen  -     HM2(CBC w/o Differential)  -     Comprehensive Metabolic Panel  -     Lipid Cascade    Hypercalcemia  -     Cologuard  -     HM2(CBC w/o Differential); Future  -     Comprehensive Metabolic Panel; Future  -     Lipid Cascade; Future  -     PSA (Prostatic-Specific Antigen), Annual Screen  -     HM2(CBC w/o Differential)  -     Comprehensive Metabolic Panel  -     Lipid Cascade    Osteopenia, unspecified location  -     DXA Bone Density Scan; Future    Disorder of bone   -     DXA Bone Density Scan; Future              Medications:   Prior to Admission medications    Medication Sig Start Date End Date Taking? Authorizing Provider   aspirin 81 MG EC tablet Take 81 mg by mouth daily.   Yes PROVIDER, HISTORICAL   budesonide-formoterol (SYMBICORT) 80-4.5 mcg/actuation inhaler Inhale 2 puffs 2 (two) times a day.   Yes PROVIDER, HISTORICAL   carBAMazepine (TEGRETOL) 200 mg tablet Take 200 mg by mouth 3 (three) times a day. Takes 1 in the am and 2 hs   Yes PROVIDER, HISTORICAL   cholecalciferol, vitamin D3, 1,000 unit tablet Take 1,000 Units by mouth daily.   Yes PROVIDER, HISTORICAL   finasteride (PROSCAR) 5 mg tablet TK 1 T PO  QD 1/10/17  Yes PROVIDER, HISTORICAL   magnesium hydroxide (MILK OF MAG) 400 mg/5 mL Susp suspension Take 30 mL  by mouth daily as needed.   Yes PROVIDER, HISTORICAL   omeprazole (PRILOSEC) 20 MG capsule Take 1 pill before breakfast and before supper daily 3/30/16  Yes Milan Whipple MD   simvastatin (ZOCOR) 80 MG tablet Take 40 mg by mouth bedtime.   Yes PROVIDER, HISTORICAL   tamsulosin (FLOMAX) 0.4 mg Cp24 Take 0.4 mg by mouth.   Yes PROVIDER, HISTORICAL   triamcinolone (KENALOG) 0.1 % ointment  6/16/17  Yes PROVIDER, HISTORICAL   losartan-hydrochlorothiazide (HYZAAR) 100-12.5 mg per tablet Take 1 tablet by mouth daily. 6/3/19   Milan Whipple MD       Allergies:  Allergies   Allergen Reactions     Gentamicin      Penicillins        Immunizations:   Immunization History   Administered Date(s) Administered     Influenza high dose, seasonal 10/02/2015, 10/28/2016     Influenza, inj, historic,unspecified 11/20/2007, 10/12/2013     Td,adult,historic,unspecified 1943     Tdap 07/30/2017       Past Medical History: No past medical history on file.    Past Surgical History: No past surgical history on file.    Family History: No family history on file.    Social History:   Social History     Socioeconomic History     Marital status:      Spouse name: Not on file     Number of children: Not on file     Years of education: Not on file     Highest education level: Not on file   Occupational History     Not on file   Social Needs     Financial resource strain: Not on file     Food insecurity:     Worry: Not on file     Inability: Not on file     Transportation needs:     Medical: Not on file     Non-medical: Not on file   Tobacco Use     Smoking status: Former Smoker     Smokeless tobacco: Never Used   Substance and Sexual Activity     Alcohol use: Not on file     Drug use: Not on file     Sexual activity: Not on file   Lifestyle     Physical activity:     Days per week: Not on file     Minutes per session: Not on file     Stress: Not on file   Relationships     Social connections:     Talks on phone: Not on file      Gets together: Not on file     Attends Judaism service: Not on file     Active member of club or organization: Not on file     Attends meetings of clubs or organizations: Not on file     Relationship status: Not on file     Intimate partner violence:     Fear of current or ex partner: Not on file     Emotionally abused: Not on file     Physically abused: Not on file     Forced sexual activity: Not on file   Other Topics Concern     Not on file   Social History Narrative     Not on file         Milan Whipple MD      Assessment and Plan:           The patient's current medical problems were reviewed.      The following health maintenance schedule was reviewed with the patient and provided in printed form in the after visit summary:   Health Maintenance   Topic Date Due     ZOSTER VACCINES (1 of 2) 09/27/1993     PNEUMOCOCCAL POLYSACCHARIDE VACCINE AGE 65 AND OVER  09/27/2008     PNEUMOCOCCAL CONJUGATE VACCINE FOR ADULTS (PCV13 OR PREVNAR)  09/27/2008     INFLUENZA VACCINE RULE BASED (Season Ended) 08/01/2019     COLONOSCOPY  12/30/2019     FALL RISK ASSESSMENT  06/03/2020     ADVANCE DIRECTIVES DISCUSSED WITH PATIENT  08/14/2020     TD 18+ HE  07/30/2027        Subjective:   Chief Complaint: Nadeem Rosas is an 73 y.o. male here for an Annual Wellness visit.   HPI:    Review of Systems:    Please see above.  The rest of the review of systems are negative for all systems.    Patient Care Team:  Milan Whipple MD as PCP - General     Patient Active Problem List   Diagnosis     Ependymoma Of The Brain     Essential Hypercholesterolemia     Overweight     Cognitive Disorder     Essential Hypertension     Bronchitis     Obstructive chronic bronchitis with exacerbation (H)     Hyperglycemia     Hypercholesteremia     Epilepsy And Recurrent Seizures     Hypercalcemia     Screening for prostate cancer     Medication management     Sepsis (H)     ARF (acute renal failure) (H)     Non-small cell lung cancer, right (H)      Venous insufficiency     Mediastinal mass     BPH (benign prostatic hypertrophy) with urinary obstruction     Osteopenia, unspecified location     No past medical history on file.   No past surgical history on file.   No family history on file.   Social History     Socioeconomic History     Marital status:      Spouse name: Not on file     Number of children: Not on file     Years of education: Not on file     Highest education level: Not on file   Occupational History     Not on file   Social Needs     Financial resource strain: Not on file     Food insecurity:     Worry: Not on file     Inability: Not on file     Transportation needs:     Medical: Not on file     Non-medical: Not on file   Tobacco Use     Smoking status: Former Smoker     Smokeless tobacco: Never Used   Substance and Sexual Activity     Alcohol use: Not on file     Drug use: Not on file     Sexual activity: Not on file   Lifestyle     Physical activity:     Days per week: Not on file     Minutes per session: Not on file     Stress: Not on file   Relationships     Social connections:     Talks on phone: Not on file     Gets together: Not on file     Attends Hoahaoism service: Not on file     Active member of club or organization: Not on file     Attends meetings of clubs or organizations: Not on file     Relationship status: Not on file     Intimate partner violence:     Fear of current or ex partner: Not on file     Emotionally abused: Not on file     Physically abused: Not on file     Forced sexual activity: Not on file   Other Topics Concern     Not on file   Social History Narrative     Not on file      Current Outpatient Medications   Medication Sig Dispense Refill     aspirin 81 MG EC tablet Take 81 mg by mouth daily.       budesonide-formoterol (SYMBICORT) 80-4.5 mcg/actuation inhaler Inhale 2 puffs 2 (two) times a day.       carBAMazepine (TEGRETOL) 200 mg tablet Take 200 mg by mouth 3 (three) times a day. Takes 1 in the am and 2  "hs       cholecalciferol, vitamin D3, 1,000 unit tablet Take 1,000 Units by mouth daily.       finasteride (PROSCAR) 5 mg tablet TK 1 T PO  QD  3     magnesium hydroxide (MILK OF MAG) 400 mg/5 mL Susp suspension Take 30 mL by mouth daily as needed.       omeprazole (PRILOSEC) 20 MG capsule Take 1 pill before breakfast and before supper daily 180 capsule 3     simvastatin (ZOCOR) 80 MG tablet Take 40 mg by mouth bedtime.       tamsulosin (FLOMAX) 0.4 mg Cp24 Take 0.4 mg by mouth.       triamcinolone (KENALOG) 0.1 % ointment        losartan-hydrochlorothiazide (HYZAAR) 100-12.5 mg per tablet Take 1 tablet by mouth daily. 30 tablet 11     No current facility-administered medications for this visit.       Objective:   Vital Signs:   Visit Vitals  /70 (Patient Site: Left Arm, Patient Position: Sitting, Cuff Size: Adult Regular)   Pulse 76   Ht 5' 6\" (1.676 m)   Wt 202 lb 0.6 oz (91.6 kg)   SpO2 99%   BMI 32.61 kg/m         VisionScreening:  No exam data present     PHYSICAL EXAM    Assessment Results 6/3/2019   Activities of Daily Living No help needed   Instrumental Activities of Daily Living No help needed   Mini Cog Total Score 5   Some recent data might be hidden     A Mini-Cog score of 0-2 suggests the possibility of dementia, score of 3-5 suggests no dementia    Identified Health Risks:     He is at risk for lack of exercise and has been provided with information to increase physical activity for the benefit of his well-being.  The patient was counseled and encouraged to consider modifying their diet and eating habits. He was provided with information on recommended healthy diet options.  The patient was provided with written information regarding signs of hearing loss.  "

## 2021-05-30 VITALS — WEIGHT: 204 LBS | BODY MASS INDEX: 32.78 KG/M2 | HEIGHT: 66 IN

## 2021-05-31 VITALS — BODY MASS INDEX: 32.95 KG/M2 | HEIGHT: 66 IN | WEIGHT: 205 LBS

## 2021-06-02 ENCOUNTER — RECORDS - HEALTHEAST (OUTPATIENT)
Dept: ADMINISTRATIVE | Facility: CLINIC | Age: 76
End: 2021-06-02

## 2021-06-03 VITALS — BODY MASS INDEX: 32.47 KG/M2 | WEIGHT: 202.04 LBS | HEIGHT: 66 IN

## 2021-06-03 NOTE — PROGRESS NOTES
"Office Visit - Physical    Nadeem Rosas   74 y.o. 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  This was complicated by intracerebral bleed and stroke.  This had left him with a mild cognitive deficit and significant disability.  History    History of obstructive lung disease and has been followed for many years by the [facility].  Generally doing well without major new concerns or complaints.  History of osteopenia stable.  History of non-small cell lung cancer right lung resected no evidence of recurrence    History of seizure disorder on Tegretol managed by [facility].    Wellness information including health risk assessment cognitive assessment mood assessment and advanced directives reviewed    Did undergo successful colon cancer screening is up-to-date on all screening measures.    Comprehensive labs are ordered.  Generally has no new complaints and has been stable.    ROS: A comprehensive review of systems was performed and was otherwise negative except as mentioned above.    Exam   Alert and oriented mild cognitive deficit previously described no change skin and lymphatics negative    Extremely large ventral hernia without evidence of obstruction no change lungs clear good breath sounds heart negative head and neck unremarkable no masses extremities normal neuro exam no focal findings  /60 (Patient Site: Left Arm, Patient Position: Sitting, Cuff Size: Adult Regular)   Pulse 85   Ht 5' 6\" (1.676 m)   Wt 204 lb (92.5 kg)   SpO2 96%   BMI 32.93 kg/m      Assessment and Plan    Problems as noted above and below current regimen reviewed and reconciled no change    Continue with routine healthcare maintenance and regular visits " twice a year discussed new internist    Nadeem was seen today for medication refill.    Diagnoses and all orders for this visit:    Essential Hypercholesterolemia  -     Lipid Cascade; Future  -     HM2(CBC w/o Differential); Future  -     Lipid Cascade  -     HM2(CBC w/o Differential)    Age-related osteoporosis without current pathological fracture  -     alendronate (FOSAMAX) 70 MG tablet; Take 1 tablet (70 mg total) by mouth every 7 days. Take in the morning on an empty stomach with a full glass of water 30 minutes before food  -     Comprehensive Metabolic Panel; Future  -     HM2(CBC w/o Differential); Future  -     Comprehensive Metabolic Panel  -     HM2(CBC w/o Differential)    Routine general medical examination at a health care facility  -     losartan-hydrochlorothiazide (HYZAAR) 100-12.5 mg per tablet; Take 1 tablet by mouth daily.  -     Comprehensive Metabolic Panel; Future  -     HM2(CBC w/o Differential); Future  -     Comprehensive Metabolic Panel  -     HM2(CBC w/o Differential)    Malignant neoplasm of cerebral ventricle (H)  -     HM2(CBC w/o Differential); Future  -     HM2(CBC w/o Differential)    Essential hypertension  -     HM2(CBC w/o Differential); Future  -     HM2(CBC w/o Differential)    Disorder of bone density and structure, unspecified   -     HM2(CBC w/o Differential); Future  -     HM2(CBC w/o Differential)    Osteopenia, unspecified location  -     HM2(CBC w/o Differential); Future  -     HM2(CBC w/o Differential)    Non-small cell lung cancer, right (H)  -     HM2(CBC w/o Differential); Future  -     HM2(CBC w/o Differential)    Obstructive chronic bronchitis with exacerbation (H)  -     HM2(CBC w/o Differential); Future  -     HM2(CBC w/o Differential)    Medication management  -     Carbamazepine [Tegretol ]  -     HM2(CBC w/o Differential); Future  -     HM2(CBC w/o Differential)              Medications:   Prior to Admission medications    Medication Sig Start Date End Date  Taking? Authorizing Provider   alendronate (FOSAMAX) 70 MG tablet Take 1 tablet (70 mg total) by mouth every 7 days. Take in the morning on an empty stomach with a full glass of water 30 minutes before food 11/19/19  Yes Milan Whipple MD   aspirin 81 MG EC tablet Take 81 mg by mouth daily.   Yes PROVIDER, HISTORICAL   budesonide-formoterol (SYMBICORT) 80-4.5 mcg/actuation inhaler Inhale 2 puffs 2 (two) times a day.   Yes PROVIDER, HISTORICAL   carBAMazepine (TEGRETOL) 200 mg tablet Take 200 mg by mouth 2 (two) times a day. Takes 1 in the am and 2 hs          Yes PROVIDER, HISTORICAL   cholecalciferol, vitamin D3, 1,000 unit tablet Take 1,000 Units by mouth daily.   Yes PROVIDER, HISTORICAL   finasteride (PROSCAR) 5 mg tablet TK 1 T PO  QD 1/10/17  Yes PROVIDER, HISTORICAL   losartan-hydrochlorothiazide (HYZAAR) 100-12.5 mg per tablet Take 1 tablet by mouth daily. 11/19/19  Yes Milan Whipple MD   magnesium hydroxide (MILK OF MAG) 400 mg/5 mL Susp suspension Take 30 mL by mouth daily as needed.   Yes PROVIDER, HISTORICAL   omeprazole (PRILOSEC) 20 MG capsule Take 1 pill before breakfast and before supper daily 3/30/16  Yes Milan Whipple MD   simvastatin (ZOCOR) 80 MG tablet Take 40 mg by mouth bedtime.   Yes PROVIDER, HISTORICAL   tamsulosin (FLOMAX) 0.4 mg Cp24 Take 0.4 mg by mouth.   Yes PROVIDER, HISTORICAL   triamcinolone (KENALOG) 0.1 % ointment  6/16/17  Yes PROVIDER, HISTORICAL       Allergies:  Allergies   Allergen Reactions     Gentamicin      Penicillins        Immunizations:   Immunization History   Administered Date(s) Administered     Influenza high dose,seasonal,PF, 65+ yrs 10/02/2015, 10/28/2016     Influenza, inj, historic,unspecified 11/20/2007, 10/12/2013     Td,adult,historic,unspecified 1943     Tdap 07/30/2017       Past Medical History: No past medical history on file.    Past Surgical History: No past surgical history on file.    Family History: No family history on  file.    Social History:   Social History     Socioeconomic History     Marital status:      Spouse name: Not on file     Number of children: Not on file     Years of education: Not on file     Highest education level: Not on file   Occupational History     Not on file   Social Needs     Financial resource strain: Not on file     Food insecurity:     Worry: Not on file     Inability: Not on file     Transportation needs:     Medical: Not on file     Non-medical: Not on file   Tobacco Use     Smoking status: Former Smoker     Smokeless tobacco: Never Used   Substance and Sexual Activity     Alcohol use: Not on file     Drug use: Not on file     Sexual activity: Not on file   Lifestyle     Physical activity:     Days per week: Not on file     Minutes per session: Not on file     Stress: Not on file   Relationships     Social connections:     Talks on phone: Not on file     Gets together: Not on file     Attends Rastafarian service: Not on file     Active member of club or organization: Not on file     Attends meetings of clubs or organizations: Not on file     Relationship status: Not on file     Intimate partner violence:     Fear of current or ex partner: Not on file     Emotionally abused: Not on file     Physically abused: Not on file     Forced sexual activity: Not on file   Other Topics Concern     Not on file   Social History Narrative     Not on file         Milan Whipple MD      Assessment and Plan:         The patient's current medical problems were reviewed.      The following health maintenance schedule was reviewed with the patient and provided in printed form in the after visit summary:   Health Maintenance   Topic Date Due     ZOSTER VACCINES (1 of 2) 09/27/1993     PNEUMOCOCCAL IMMUNIZATION 65+ LOW/MEDIUM RISK (1 of 2 - PCV13) 09/27/2008     MEDICARE ANNUAL WELLNESS VISIT  06/03/2020     FALL RISK ASSESSMENT  06/03/2020     ADVANCE CARE PLANNING  08/14/2020     LIPID  11/19/2024     TD 18+ HE   07/30/2027     INFLUENZA VACCINE RULE BASED  Completed        Subjective:   Chief Complaint: Nadeem Rosas is an 74 y.o. male here for an Annual Wellness visit.   HPI:      Review of Systems:   Please see above.  The rest of the review of systems are negative for all systems.    Patient Care Team:  Milan Whipple MD as PCP - General  Milan Whipple MD as Assigned PCP     Patient Active Problem List   Diagnosis     Ependymoma Of The Brain     Essential Hypercholesterolemia     Overweight     Cognitive Disorder     Essential Hypertension     Bronchitis     Obstructive chronic bronchitis with exacerbation (H)     Hyperglycemia     Hypercholesteremia     Epilepsy And Recurrent Seizures     Hypercalcemia     Screening for prostate cancer     Medication management     Sepsis (H)     ARF (acute renal failure) (H)     Non-small cell lung cancer, right (H)     Venous insufficiency     Mediastinal mass     Benign prostatic hyperplasia with urinary obstruction     Osteopenia, unspecified location     Age-related osteoporosis without current pathological fracture     No past medical history on file.   No past surgical history on file.   No family history on file.   Social History     Socioeconomic History     Marital status:      Spouse name: Not on file     Number of children: Not on file     Years of education: Not on file     Highest education level: Not on file   Occupational History     Not on file   Social Needs     Financial resource strain: Not on file     Food insecurity:     Worry: Not on file     Inability: Not on file     Transportation needs:     Medical: Not on file     Non-medical: Not on file   Tobacco Use     Smoking status: Former Smoker     Smokeless tobacco: Never Used   Substance and Sexual Activity     Alcohol use: Not on file     Drug use: Not on file     Sexual activity: Not on file   Lifestyle     Physical activity:     Days per week: Not on file     Minutes per session: Not on file      Stress: Not on file   Relationships     Social connections:     Talks on phone: Not on file     Gets together: Not on file     Attends Evangelical service: Not on file     Active member of club or organization: Not on file     Attends meetings of clubs or organizations: Not on file     Relationship status: Not on file     Intimate partner violence:     Fear of current or ex partner: Not on file     Emotionally abused: Not on file     Physically abused: Not on file     Forced sexual activity: Not on file   Other Topics Concern     Not on file   Social History Narrative     Not on file      Current Outpatient Medications   Medication Sig Dispense Refill     alendronate (FOSAMAX) 70 MG tablet Take 1 tablet (70 mg total) by mouth every 7 days. Take in the morning on an empty stomach with a full glass of water 30 minutes before food 12 tablet 3     aspirin 81 MG EC tablet Take 81 mg by mouth daily.       budesonide-formoterol (SYMBICORT) 80-4.5 mcg/actuation inhaler Inhale 2 puffs 2 (two) times a day.       carBAMazepine (TEGRETOL) 200 mg tablet Take 200 mg by mouth 2 (two) times a day. Takes 1 in the am and 2 hs              cholecalciferol, vitamin D3, 1,000 unit tablet Take 1,000 Units by mouth daily.       finasteride (PROSCAR) 5 mg tablet TK 1 T PO  QD  3     losartan-hydrochlorothiazide (HYZAAR) 100-12.5 mg per tablet Take 1 tablet by mouth daily. 90 tablet 3     magnesium hydroxide (MILK OF MAG) 400 mg/5 mL Susp suspension Take 30 mL by mouth daily as needed.       omeprazole (PRILOSEC) 20 MG capsule Take 1 pill before breakfast and before supper daily 180 capsule 3     simvastatin (ZOCOR) 80 MG tablet Take 40 mg by mouth bedtime.       tamsulosin (FLOMAX) 0.4 mg Cp24 Take 0.4 mg by mouth.       triamcinolone (KENALOG) 0.1 % ointment        No current facility-administered medications for this visit.       Objective:   Vital Signs:   Visit Vitals  /60 (Patient Site: Left Arm, Patient Position: Sitting, Cuff  "Size: Adult Regular)   Pulse 85   Ht 5' 6\" (1.676 m)   Wt 204 lb (92.5 kg)   SpO2 96%   BMI 32.93 kg/m         VisionScreening:  No exam data present     PHYSICAL EXAM    Assessment Results 6/3/2019   Activities of Daily Living No help needed   Instrumental Activities of Daily Living No help needed   Mini Cog Total Score 5   Some recent data might be hidden     A Mini-Cog score of 0-2 suggests the possibility of dementia, score of 3-5 suggests no dementia    Identified Health Risks:     He is at risk for lack of exercise and has been provided with information to increase physical activity for the benefit of his well-being.  The patient was counseled and encouraged to consider modifying their diet and eating habits. He was provided with information on recommended healthy diet options.  The patient was provided with written information regarding signs of hearing loss.  "

## 2021-06-04 VITALS
DIASTOLIC BLOOD PRESSURE: 60 MMHG | OXYGEN SATURATION: 96 % | BODY MASS INDEX: 32.78 KG/M2 | HEART RATE: 85 BPM | HEIGHT: 66 IN | SYSTOLIC BLOOD PRESSURE: 118 MMHG | WEIGHT: 204 LBS

## 2021-06-08 NOTE — PROGRESS NOTES
Office Visit - Follow up    Nadeem Rosas   71 y.o. male    Date of Visit: 2/3/2017    Chief Complaint   Patient presents with     Pre-op Exam     Wedge resections on 2/10 at [facility] by Dr Hunt       Subjective: Complex patient here for preoperative evaluation.  Recent history includes diagnosis of non-small cell adenocarcinoma the right lung treated with radiation last year    Did well and responded well however follow-up CT scan of the chest revealed a nodule in the superior mediastinum adjacent to the esophagus with mild activity on PET scan.  There was also evidence of another nodule in the right upper lobe which may be related to previous malignancy.  A esophageal ultrasound performed in late December at [facility] was nondiagnostic.  The patient has seen Dr. Hunt a cardiovascular surgeon who plans robotic thoracoscopy and mediastinoscopy with wedge resection of the right upper lobe mass and biopsy and/or resection of the mediastinal mass.    We discussed this at length with time and his wife Tierney do not anticipate any major complications with surgery.    Complex history which includes Senokot fourth ventricle ependymoma resected approximately 20 years ago complicated by postoperative hemorrhage stroke and seizure disorder.  Also has history of COPD chronic venous insufficiency and BPH as well as renal insufficiency.    Gen. health has otherwise been stable there is no change in his current regimen    Current medications reviewed discussed and reconciled    No recent respiratory urinary or GI symptoms        ROS: A comprehensive review of systems was performed and was otherwise negative except as mentioned above.     Exam  Alert and oriented he does have mild decrease in cognitive deficit but for the most part has good insight into his current condition.  Head and neck negative lungs clear good breath sounds bilaterally no rales wheezes or rhonchi heart negative abdomen soft extremely  "large ventral hernia is unchanged extremities no edema.         Visit Vitals     /80     Pulse 80     Ht 5' 6\" (1.676 m)     Wt 204 lb (92.5 kg)     BMI 32.93 kg/m2       Assessment and Plan   Right upper lobe malignancy likely adenocarcinoma to be resected/mediastinal mass likely malignant biopsy and excision planned.    Recent radiation treatment for right upper lobe nodule stable    Seizure disorder related to previous stroke and intracerebral hemorrhage stable    Remote history of ependymoma fourth ventricle resected no recurrence.    Stroke syndrome postoperative many years ago stable    COPD moderate followed by Dr. Deo amado on current management    Nadeem was seen today for pre-op exam.    Diagnoses and all orders for this visit:    Chronic Obstructive Pulmonary Disease  -     HM2(CBC w/o Differential); Future  -     Comprehensive Metabolic Panel; Future  -     HM2(CBC w/o Differential)  -     Comprehensive Metabolic Panel    Mediastinal mass  -     HM2(CBC w/o Differential); Future  -     Comprehensive Metabolic Panel; Future  -     HM2(CBC w/o Differential)  -     Comprehensive Metabolic Panel    Hyperglycemia  -     HM2(CBC w/o Differential); Future  -     Comprehensive Metabolic Panel; Future  -     HM2(CBC w/o Differential)  -     Comprehensive Metabolic Panel    Non-small cell lung cancer, right  -     HM2(CBC w/o Differential); Future  -     Comprehensive Metabolic Panel; Future  -     HM2(CBC w/o Differential)  -     Comprehensive Metabolic Panel    Venous insufficiency  -     HM2(CBC w/o Differential); Future  -     Comprehensive Metabolic Panel; Future  -     HM2(CBC w/o Differential)  -     Comprehensive Metabolic Panel    Acute renal failure with acute cortical necrosis  -     HM2(CBC w/o Differential); Future  -     Comprehensive Metabolic Panel; Future  -     HM2(CBC w/o Differential)  -     Comprehensive Metabolic Panel    BPH (benign prostatic hypertrophy) with urinary " obstruction  -     HM2(CBC w/o Differential); Future  -     Comprehensive Metabolic Panel; Future  -     HM2(CBC w/o Differential)  -     Comprehensive Metabolic Panel    Essential hypertension with goal blood pressure less than 140/90    Malignant neoplasm of brain, unspecified location    Epilepsy          Time: total time spent with the patient was 50 minutes of which >50% was spent in counseling and coordination of care        Allergies   Allergen Reactions     Gentamicin      Penicillins        Medications :  Prior to Admission medications    Medication Sig Start Date End Date Taking? Authorizing Provider   aspirin 81 MG EC tablet Take 81 mg by mouth daily.   Yes PROVIDER, HISTORICAL   budesonide-formoterol (SYMBICORT) 80-4.5 mcg/actuation inhaler Inhale 2 puffs 2 (two) times a day.   Yes PROVIDER, HISTORICAL   carBAMazepine (TEGRETOL) 200 mg tablet Take 200 mg by mouth 3 (three) times a day. Takes 1 in the am and 2 hs   Yes PROVIDER, HISTORICAL   cholecalciferol, vitamin D3, 1,000 unit tablet Take 1,000 Units by mouth daily.   Yes PROVIDER, HISTORICAL   finasteride (PROSCAR) 5 mg tablet TK 1 T PO  QD 1/10/17  Yes PROVIDER, HISTORICAL   hydrochlorothiazide (HYDRODIURIL) 25 MG tablet Take 12.5 mg by mouth daily.   Yes PROVIDER, HISTORICAL   magnesium hydroxide (MILK OF MAG) 400 mg/5 mL Susp suspension Take 30 mL by mouth daily as needed.   Yes PROVIDER, HISTORICAL   omeprazole (PRILOSEC) 20 MG capsule Take 1 pill before breakfast and before supper daily 3/30/16  Yes Milan Whipple MD   simvastatin (ZOCOR) 80 MG tablet Take 40 mg by mouth bedtime.   Yes PROVIDER, HISTORICAL   tamsulosin (FLOMAX) 0.4 mg Cp24 Take 0.4 mg by mouth.   Yes PROVIDER, HISTORICAL        Past Medical History: No past medical history on file.    Past Surgical History: No past surgical history on file.    Social History:   Social History     Social History     Marital status:      Spouse name: N/A     Number of children: N/A      Years of education: N/A     Occupational History     Not on file.     Social History Main Topics     Smoking status: Former Smoker     Smokeless tobacco: Not on file     Alcohol use Not on file     Drug use: Not on file     Sexual activity: Not on file     Other Topics Concern     Not on file     Social History Narrative       Family History: No family history on file.      Milan Whipple MD

## 2021-06-11 NOTE — PROGRESS NOTES
"Office Visit - Follow up    Nadeem Rosas   71 y.o. male    Date of Visit: 6/1/2017    Chief Complaint   Patient presents with     Rash     Itchy rash all over body for 2 weeks       Subjective: Time was seen in urgent care with multiple focal skin eruptions on his back    These were pruritic small circular lesions that tended to come and go they had the appearance of an insect bite.  He was treated with hydrocortisone cream and apparently doxycycline for suspected tickborne illness or Lyme disease no testing was done.    He has noted persistent and migratory small pruritic lesions on and off over the last week or so.    ROS: A comprehensive review of systems was performed and was otherwise negative except as mentioned above.     Exam  Exam reveals at least 3-5 separate lesions on his back and one under his right nipple this has the appearance of a spider bite or similar infestation there is no evidence of furling these are papular erythematous lesions but otherwise not characteristic of any specific infestation.   /72  Pulse 78  Ht 5' 6\" (1.676 m)  Wt 205 lb (93 kg)  BMI 33.09 kg/m2    Assessment and Plan  I suspect spider bite or other similar insect bite I did reassure him I would not continue with doxycycline I see no indication that doxycycline would be helpful at this point and given potential drug interactions would tend to stop this they will call if not improved    Nadeem was seen today for rash.    Diagnoses and all orders for this visit:    Spider bite    Other orders  -     hydrOXYzine (ATARAX) 25 MG tablet; Take 1 tablet (25 mg total) by mouth 4 (four) times a day as needed for itching.          Time: total time spent with the patient was 15 minutes of which >50% was spent in counseling and coordination of care        Allergies   Allergen Reactions     Gentamicin      Penicillins        Medications :  Prior to Admission medications    Medication Sig Start Date End Date Taking? Authorizing " Provider   aspirin 81 MG EC tablet Take 81 mg by mouth daily.   Yes PROVIDER, HISTORICAL   budesonide-formoterol (SYMBICORT) 80-4.5 mcg/actuation inhaler Inhale 2 puffs 2 (two) times a day.   Yes PROVIDER, HISTORICAL   carBAMazepine (TEGRETOL) 200 mg tablet Take 200 mg by mouth 3 (three) times a day. Takes 1 in the am and 2 hs   Yes PROVIDER, HISTORICAL   cholecalciferol, vitamin D3, 1,000 unit tablet Take 1,000 Units by mouth daily.   Yes PROVIDER, HISTORICAL   diphenhydrAMINE (BENADRYL) 2 % cream Apply topically. 5/20/17  Yes PROVIDER, HISTORICAL   finasteride (PROSCAR) 5 mg tablet TK 1 T PO  QD 1/10/17  Yes PROVIDER, HISTORICAL   hydrochlorothiazide (HYDRODIURIL) 25 MG tablet Take 12.5 mg by mouth daily.   Yes PROVIDER, HISTORICAL   hydrocortisone 2.5 % cream  5/20/17  Yes PROVIDER, HISTORICAL   magnesium hydroxide (MILK OF MAG) 400 mg/5 mL Susp suspension Take 30 mL by mouth daily as needed.   Yes PROVIDER, HISTORICAL   omeprazole (PRILOSEC) 20 MG capsule Take 1 pill before breakfast and before supper daily 3/30/16  Yes Milan Whipple MD   simvastatin (ZOCOR) 80 MG tablet Take 40 mg by mouth bedtime.   Yes PROVIDER, HISTORICAL   tamsulosin (FLOMAX) 0.4 mg Cp24 Take 0.4 mg by mouth.   Yes PROVIDER, HISTORICAL   doxycycline (VIBRAMYCIN) 100 MG capsule Take 100 mg by mouth. 5/20/17 6/1/17 Yes PROVIDER, HISTORICAL   hydrOXYzine (ATARAX) 25 MG tablet Take 1 tablet (25 mg total) by mouth 4 (four) times a day as needed for itching. 6/1/17   Milan Whipple MD        Past Medical History: No past medical history on file.    Past Surgical History: No past surgical history on file.    Social History:   Social History     Social History     Marital status:      Spouse name: N/A     Number of children: N/A     Years of education: N/A     Occupational History     Not on file.     Social History Main Topics     Smoking status: Former Smoker     Smokeless tobacco: Not on file     Alcohol use Not on file     Drug use:  Not on file     Sexual activity: Not on file     Other Topics Concern     Not on file     Social History Narrative       Family History: No family history on file.      Milan Whipple MD

## 2021-06-15 PROBLEM — N13.8 BENIGN PROSTATIC HYPERPLASIA WITH URINARY OBSTRUCTION: Status: ACTIVE | Noted: 2017-02-03

## 2021-06-15 PROBLEM — N40.1 BENIGN PROSTATIC HYPERPLASIA WITH URINARY OBSTRUCTION: Status: ACTIVE | Noted: 2017-02-03

## 2021-06-15 PROBLEM — J98.59 MEDIASTINAL MASS: Status: ACTIVE | Noted: 2017-02-03

## 2021-06-15 NOTE — PROGRESS NOTES
"Office Visit - Follow up    Nadeem Rosas   72 y.o. 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  Current medications reviewed no changes.    Does have some itchiness over the right breast region he denies a palpable mass and denies any pain or trauma.    ROS: A comprehensive review of systems was performed and was otherwise negative except as mentioned above.     Exam  Exam of the right breast reveals modest gynecomastia although it is symmetric amount of breast tissue diameter is approximately 4 cm and nontender.  No other abnormal finding   /60  Pulse 82  Ht 5' 6\" (1.676 m)  Wt 205 lb (93 kg)  BMI 33.09 kg/m2    Assessment and Plan  Benign gynecomastia, possibly related to hydrochlorothiazide I did reassure him we will continue to follow this during regular interval exams discussed at length with patient and wife    Nadeem was seen today for itchy right breast.    Diagnoses and all orders for this visit:    Gynecomastia, male          Time: total time spent with the patient was 15 minutes of which >50% was spent in counseling and coordination of care        Allergies   Allergen Reactions     Gentamicin      Penicillins        Medications :  Prior to Admission medications    Medication Sig Start Date End Date Taking? Authorizing Provider   aspirin 81 MG EC tablet Take 81 mg by mouth daily.   Yes PROVIDER, HISTORICAL   budesonide-formoterol (SYMBICORT) 80-4.5 mcg/actuation inhaler Inhale 2 puffs 2 (two) times a day.   Yes PROVIDER, HISTORICAL   carBAMazepine (TEGRETOL) 200 mg tablet Take 200 mg by mouth 3 (three) times a day. Takes 1 in the am and 2 hs   Yes PROVIDER, HISTORICAL   cholecalciferol, vitamin D3, 1,000 unit tablet Take " 1,000 Units by mouth daily.   Yes PROVIDER, HISTORICAL   finasteride (PROSCAR) 5 mg tablet TK 1 T PO  QD 1/10/17  Yes PROVIDER, HISTORICAL   hydrochlorothiazide (HYDRODIURIL) 25 MG tablet Take 12.5 mg by mouth daily.   Yes PROVIDER, HISTORICAL   magnesium hydroxide (MILK OF MAG) 400 mg/5 mL Susp suspension Take 30 mL by mouth daily as needed.   Yes PROVIDER, HISTORICAL   omeprazole (PRILOSEC) 20 MG capsule Take 1 pill before breakfast and before supper daily 3/30/16  Yes Milan Whipple MD   simvastatin (ZOCOR) 80 MG tablet Take 40 mg by mouth bedtime.   Yes PROVIDER, HISTORICAL   tamsulosin (FLOMAX) 0.4 mg Cp24 Take 0.4 mg by mouth.   Yes PROVIDER, HISTORICAL   triamcinolone (KENALOG) 0.1 % ointment  6/16/17  Yes PROVIDER, HISTORICAL   levoFLOXacin (LEVAQUIN) 500 MG tablet Take 1 tablet (500 mg total) by mouth daily for 7 days. 2/6/18 2/13/18  Milan Whipple MD        Past Medical History: No past medical history on file.    Past Surgical History: No past surgical history on file.    Social History:   Social History     Social History     Marital status:      Spouse name: N/A     Number of children: N/A     Years of education: N/A     Occupational History     Not on file.     Social History Main Topics     Smoking status: Former Smoker     Smokeless tobacco: Never Used     Alcohol use Not on file     Drug use: Not on file     Sexual activity: Not on file     Other Topics Concern     Not on file     Social History Narrative       Family History: No family history on file.      Milan Whipple MD

## 2021-06-16 PROBLEM — M85.80 OSTEOPENIA, UNSPECIFIED LOCATION: Status: ACTIVE | Noted: 2019-06-03

## 2021-06-16 PROBLEM — M81.0 AGE-RELATED OSTEOPOROSIS WITHOUT CURRENT PATHOLOGICAL FRACTURE: Status: ACTIVE | Noted: 2019-07-01

## 2021-06-17 NOTE — PATIENT INSTRUCTIONS - HE
Patient Instructions by Milan Whipple MD at 11/19/2019 11:00 AM     Author: Milan Whipple MD Service: -- Author Type: Physician    Filed: 12/3/2019  5:11 PM Encounter Date: 11/19/2019 Status: Signed    : Milan Whipple MD (Physician)         Patient Education     Exercise for a Healthier Heart  You may wonder how you can improve the health of your heart. If youre thinking about exercise, youre on the right track. You dont need to become an athlete, but you do need a certain amount of brisk exercise to help strengthen your heart. If you have been diagnosed with a heart condition, your doctor may recommend exercise to help stabilize your condition. To help make exercise a habit, choose safe, fun activities.       Be sure to check with your health care provider before starting an exercise program.    Why exercise?  Exercising regularly offers many healthy rewards. It can help you do all of the following:    Improve your blood cholesterol levels to help prevent further heart trouble    Lower your blood pressure to help prevent a stroke or heart attack    Control diabetes, or reduce your risk of getting this disease    Improve your heart and lung function    Reach and maintain a healthy weight    Make your muscles stronger and more limber so you can stay active    Prevent falls and fractures by slowing the loss of bone mass (osteoporosis)    Manage stress better  Exercise tips  Ease into your routine. Set small goals. Then build on them.  Exercise on most days. Aim for a total of 150 or more minutes of moderate to  vigorous intensity activity each week. Consider 40 minutes, 3 to 4 times a week. For best results, activity should last for 40 minutes on average. It is OK to work up to the 40 minute period over time. Examples of moderate-intensity activity is walking one mile in 15 minutes or 30 to 45 minutes of yard work.  Step up your daily activity level. Along with your exercise program, try being more  active throughout the day. Walk instead of drive. Do more household tasks or yard work.  Choose one or more activities you enjoy. Walking is one of the easiest things you can do. You can also try swimming, riding a bike, or taking an exercise class.  Stop exercising and call your doctor if you:    Have chest pain or feel dizzy or lightheaded    Feel burning, tightness, pressure, or heaviness in your chest, neck, shoulders, back, or arms    Have unusual shortness of breath    Have increased joint or muscle pain    Have palpitations or an irregular heartbeat      7482-6199 [employer]. [address] [place] [address]. All rights reserved. This information is not intended as a substitute for professional medical care. Always follow your healthcare professional's instructions.         Patient Education   Understanding [employer] MyPlate  The USDA (US Department of Agriculture) has guidelines to help you make healthy food choices. These are called MyPlate. MyPlate shows the food groups that make up healthy meals using the image of a place setting. Before you eat, think about the healthiest choices for what to put onto your plate or into your cup or bowl. To learn more about building a healthy plate, visit www.choosemyplate.gov.       The Food Groups    Fruits: Any fruit or 100% fruit juice counts as part of the Fruit Group. Fruits may be fresh, canned, frozen, or dried, and may be whole, cut-up, or pureed. Make half your plate fruits and vegetables.    Vegetables: Any vegetable or 100% vegetable juice counts as a member of the Vegetable Group. Vegetables may be fresh, frozen, canned, or dried. They can be served raw or cooked and may be whole, cut-up, or mashed. Make half your plate fruits and vegetables.     Grains: All foods made from grains are part of the Grains Group. These include wheat, rice, oats, cornmeal, and barley such as bread, pasta, oatmeal, cereal, tortillas, and grits. Grains should be  no more than a quarter of your plate. At least half of your grains should be whole grains.    Protein: This group includes meat, poultry, seafood, beans and peas, eggs, processed soy products (like tofu), nuts (including nut butters), and seeds. Make protein choices no more than a quarter of your plate. Meat and poultry choices should be lean or low fat.    Dairy: All fluid milk products and foods made from milk that contain calcium, like yogurt and cheese are part of the Dairy Group. (Foods that have little calcium, such as cream, butter, and cream cheese, are not part of the group.) Most dairy choices should be low-fat or fat-free.    Oils: These are fats that are liquid at room temperature. They include canola, corn, olive, soybean, and sunflower oil. Foods that are mainly oil include mayonnaise, certain salad dressings, and soft margarines. You should have only 5 to 7 teaspoons of oils a day. You probably already get this much from the food you eat.  Use [employer] to Help Build Your Meals  The [employer] can help you plan and track your meals and activity. You can look up individual foods to see or compare their nutritional value. You can get guidelines for what and how much you should eat. You can compare your food choices. And you can assess personal physical activities and see ways you can improve. Go to www.LikeBetter.com.gov/supertracker/.    9765-5538 [employer]. [address] [place] [address]. All rights reserved. This information is not intended as a substitute for professional medical care. Always follow your healthcare professional's instructions.           Patient Education   Signs of Hearing Loss  Hearing loss is a problem shared by many people. In fact, it is one of the most common health conditions, particularly as people age. Most people over age 65 have some hearing loss, and by age 80, almost everyone does. Because hearing loss usually occurs slowly over the years,  you may not realize your hearing ability has gotten worse.       Have your hearing checked  Contact your [facility] care provider if you:    Have to strain to hear normal conversation.    Have to watch other peoples faces very carefully to follow what theyre saying.    Need to ask people to repeat what theyve said.    Often misunderstand what people are saying.    Turn the volume of the television or radio up so high that others complain.    Feel that people are mumbling when theyre talking to you.    Find that the effort to hear leaves you feeling tired and irritated.    Notice, when using the phone, that you hear better with 1 ear than the other.    7076-9163 [employer]. [address] [place] [address], [place] [address]. All rights reserved. This information is not intended as a substitute for professional medical care. Always follow your healthcare professional's instructions.         Patient Education   Personalized Prevention Plan  You are due for the preventive services outlined below.  Your care team is available to assist you in scheduling these services.  If you have already completed any of these items, please share that information with your care team to update in your medical record.  Health Maintenance   Topic Date Due   ? ZOSTER VACCINES (1 of 2) 09/27/1993   ? PNEUMOCOCCAL IMMUNIZATION 65+ LOW/MEDIUM RISK (1 of 2 - PCV13) 09/27/2008   ? MEDICARE ANNUAL WELLNESS VISIT  06/03/2020   ? FALL RISK ASSESSMENT  06/03/2020   ? ADVANCE CARE PLANNING  08/14/2020   ? LIPID  11/19/2024   ? TD 18+ HE  07/30/2027   ? INFLUENZA VACCINE RULE BASED  Completed

## 2021-06-17 NOTE — PATIENT INSTRUCTIONS - HE
Patient Instructions by Milan Whipple MD at 6/3/2019  8:40 AM     Author: Milan Whipple MD Service: -- Author Type: Physician    Filed: 6/4/2019  3:51 PM Encounter Date: 6/3/2019 Status: Signed    : Milan Whipple MD (Physician)         Patient Education     Exercise for a Healthier Heart  You may wonder how you can improve the health of your heart. If youre thinking about exercise, youre on the right track. You dont need to become an athlete, but you do need a certain amount of brisk exercise to help strengthen your heart. If you have been diagnosed with a heart condition, your doctor may recommend exercise to help stabilize your condition. To help make exercise a habit, choose safe, fun activities.       Be sure to check with your health care provider before starting an exercise program.    Why exercise?  Exercising regularly offers many healthy rewards. It can help you do all of the following:    Improve your blood cholesterol levels to help prevent further heart trouble    Lower your blood pressure to help prevent a stroke or heart attack    Control diabetes, or reduce your risk of getting this disease    Improve your heart and lung function    Reach and maintain a healthy weight    Make your muscles stronger and more limber so you can stay active    Prevent falls and fractures by slowing the loss of bone mass (osteoporosis)    Manage stress better  Exercise tips  Ease into your routine. Set small goals. Then build on them.  Exercise on most days. Aim for a total of 150 or more minutes of moderate to  vigorous intensity activity each week. Consider 40 minutes, 3 to 4 times a week. For best results, activity should last for 40 minutes on average. It is OK to work up to the 40 minute period over time. Examples of moderate-intensity activity is walking one mile in 15 minutes or 30 to 45 minutes of yard work.  Step up your daily activity level. Along with your exercise program, try being more active  throughout the day. Walk instead of drive. Do more household tasks or yard work.  Choose one or more activities you enjoy. Walking is one of the easiest things you can do. You can also try swimming, riding a bike, or taking an exercise class.  Stop exercising and call your doctor if you:    Have chest pain or feel dizzy or lightheaded    Feel burning, tightness, pressure, or heaviness in your chest, neck, shoulders, back, or arms    Have unusual shortness of breath    Have increased joint or muscle pain    Have palpitations or an irregular heartbeat      [id number] [employer]. [address] [place] [address]. All rights reserved. This information is not intended as a substitute for professional medical care. Always follow your healthcare professional's instructions.         Patient Education   Understanding [employer] MyPlate  The USDA (US Department of Agriculture) has guidelines to help you make healthy food choices. These are called MyPlate. MyPlate shows the food groups that make up healthy meals using the image of a place setting. Before you eat, think about the healthiest choices for what to put onto your plate or into your cup or bowl. To learn more about building a healthy plate, visit www.choosemyplate.gov.       The Food Groups    Fruits: Any fruit or 100% fruit juice counts as part of the Fruit Group. Fruits may be fresh, canned, frozen, or dried, and may be whole, cut-up, or pureed. Make half your plate fruits and vegetables.    Vegetables: Any vegetable or 100% vegetable juice counts as a member of the Vegetable Group. Vegetables may be fresh, frozen, canned, or dried. They can be served raw or cooked and may be whole, cut-up, or mashed. Make half your plate fruits and vegetables.     Grains: All foods made from grains are part of the Grains Group. These include wheat, rice, oats, cornmeal, and barley such as bread, pasta, oatmeal, cereal, tortillas, and grits. Grains should be no more  than a quarter of your plate. At least half of your grains should be whole grains.    Protein: This group includes meat, poultry, seafood, beans and peas, eggs, processed soy products (like tofu), nuts (including nut butters), and seeds. Make protein choices no more than a quarter of your plate. Meat and poultry choices should be lean or low fat.    Dairy: All fluid milk products and foods made from milk that contain calcium, like yogurt and cheese are part of the Dairy Group. (Foods that have little calcium, such as cream, butter, and cream cheese, are not part of the group.) Most dairy choices should be low-fat or fat-free.    Oils: These are fats that are liquid at room temperature. They include canola, corn, olive, soybean, and sunflower oil. Foods that are mainly oil include mayonnaise, certain salad dressings, and soft margarines. You should have only 5 to 7 teaspoons of oils a day. You probably already get this much from the food you eat.  Use [employer] to Help Build Your Meals  The Book[employer]rainings[employer]cker can help you plan and track your meals and activity. You can look up individual foods to see or compare their nutritional value. You can get guidelines for what and how much you should eat. You can compare your food choices. And you can assess personal physical activities and see ways you can improve. Go to www.Historic Futures.gov/supertracker/.    0135-0384 [employer]. [address] [place] [address]. All rights reserved. This information is not intended as a substitute for professional medical care. Always follow your healthcare professional's instructions.           Patient Education   Signs of Hearing Loss  Hearing loss is a problem shared by many people. In fact, it is one of the most common health conditions, particularly as people age. Most people over age 65 have some hearing loss, and by age 80, almost everyone does. Because hearing loss usually occurs slowly over the years, you may  not realize your hearing ability has gotten worse.       Have your hearing checked  Contact your [facility] care provider if you:    Have to strain to hear normal conversation.    Have to watch other peoples faces very carefully to follow what theyre saying.    Need to ask people to repeat what theyve said.    Often misunderstand what people are saying.    Turn the volume of the television or radio up so high that others complain.    Feel that people are mumbling when theyre talking to you.    Find that the effort to hear leaves you feeling tired and irritated.    Notice, when using the phone, that you hear better with 1 ear than the other.    8997-9487 The [employer]. [address] [place] [address], [place] [address]. All rights reserved. This information is not intended as a substitute for professional medical care. Always follow your healthcare professional's instructions.         Patient Education   Personalized Prevention Plan  You are due for the preventive services outlined below.  Your care team is available to assist you in scheduling these services.  If you have already completed any of these items, please share that information with your care team to update in your medical record.  Health Maintenance   Topic Date Due   ? ZOSTER VACCINES (1 of 2) 09/27/1993   ? PNEUMOCOCCAL POLYSACCHARIDE VACCINE AGE 65 AND OVER  09/27/2008   ? PNEUMOCOCCAL CONJUGATE VACCINE FOR ADULTS (PCV13 OR PREVNAR)  09/27/2008   ? INFLUENZA VACCINE RULE BASED (Season Ended) 08/01/2019   ? COLONOSCOPY  12/30/2019   ? FALL RISK ASSESSMENT  06/03/2020   ? ADVANCE DIRECTIVES DISCUSSED WITH PATIENT  08/14/2020   ? TD 18+ HE  07/30/2027

## 2021-06-19 NOTE — LETTER
Letter by Milan Whipple MD at      Author: Milan Whpiple MD Service: -- Author Type: --    Filed:  Encounter Date: 7/1/2019 Status: (Other)         Nadeem Rosas  [id number] [facility] [id number]             July 1, 2019         Dear Mr. Rosas,    Below are the results from your recent visit:    Resulted Orders   DXA Bone Density Scan    Narrative    6/12/2019      RE: Nadeem Rosas  YOB: 1945        Dear Milan Whipple,    Patient Profile:  73 y.o. male, , is here for the first bone density test at this site.  History of fractures - None. Family history of osteoporosis - None.    Family history of hip fracture: None. Smoking history - Past. Osteoporosis   treatment past -  Yes;  Bisphosphonates. Osteoporosis treatment current -   No.  Chronic medical problems - Height loss and Radiation treatment. High   risk medications -  Anti-seizure;  Yes, Currently.    Assessment:    1. The spine bone density is best assessed using L1-L2 with T-score of   -4.4.  At this site, bone density is lower than age expected with a Z   score of -4.3.  2. Femoral bone densities show left femoral neck T- score of -2.8, and   right femoral neck T-score of -2.4..  3.  His previous scan is not available for comparison.    73 y.o. male with OSTEOPOROSIS and HIGH predicted future fracture risk.       Recommendations:  Further evaluation and therapeutic intervention is advised with a recheck   in 1 year.      Bone densitometry was performed on your patient using our [employer]   densitometer. The results are summarized and a copy of the actual scans   are included for your review. In conformity with the International Society   of Clinical Densitometry's most recent position statement for DXA   interpretation (2015), the diagnosis will be made on the lowest measured   T-score of the lumbar spine, femoral neck, total proximal femur or 33%   radius. Note the change in terminology for diagnostic classification  from   OSTEOPENIA to LOW BONE MASS. All trending for sequential exams will be   done using multiple vertebrae or the total proximal femur. Fracture risk   is based on the WHO Fracture Risk Assessment Tool (FRAX). If additional   information is needed or if you would like to discuss the results, please   do not hesitate to call me.       Thank you for referring this patient to [facility].   We are happy to be of service in support of you and your practice. If you   have any questions or suggestions to improve our service, please call me   at [phone].     Sincerely,     Kang Doan M.D. NOELCORLY.  Osteoporosis Services, [facility]         I did leave you a phone message.  Your bone density is a bit low it is not severely low and you do not have high risk of a pathologic fracture however your bones are less dense than we would like them to be at your age.    As I indicated I have sent in a prescription for a medicine called alendronate.  You take this once weekly.  The instructions on how to take this are fairly specific as this can irritate the esophagus if you lie down after you take it.  You can also take it with some antacids.    As I noted your osteoporosis is not severe however over the next 10 years or so you have a much lower risk of sustaining fractures if you take this medication which will help strengthen your bones for the next 5 years    Please call with questions or contact us using [employer].    Sincerely,        Electronically signed by Milan Whipple MD

## 2021-06-19 NOTE — LETTER
Letter by Milan Whipple MD at      Author: Milan Whipple MD Service: -- Author Type: --    Filed:  Encounter Date: 11/21/2019 Status: Signed         Nadeem Rosas  [id number] [facility] [address]             November 21, 2019         Dear Mr. Rosas,    Below are the results from your recent visit:    Resulted Orders   Carbamazepine [Tegretol ]   Result Value Ref Range    Carbamazepine 7.1 4.0 - 12.0 ug/mL   Comprehensive Metabolic Panel   Result Value Ref Range    Sodium 140 136 - 145 mmol/L    Potassium 4.8 3.5 - 5.0 mmol/L    Chloride 101 98 - 107 mmol/L    CO2 31 22 - 31 mmol/L    Anion Gap, Calculation 8 5 - 18 mmol/L    Glucose 102 70 - 125 mg/dL    BUN 25 8 - 28 mg/dL    Creatinine 0.94 0.70 - 1.30 mg/dL    GFR MDRD Af Amer >60 >60 mL/min/1.73m2    GFR MDRD Non Af Amer >60 >60 mL/min/1.73m2    Bilirubin, Total 0.3 0.0 - 1.0 mg/dL    Calcium 9.1 8.5 - 10.5 mg/dL    Protein, Total 6.9 6.0 - 8.0 g/dL    Albumin 3.9 3.5 - 5.0 g/dL    Alkaline Phosphatase 49 45 - 120 U/L    AST 17 0 - 40 U/L    ALT 21 0 - 45 U/L    Narrative    Fasting Glucose reference range is 70-99 mg/dL per  American Diabetes Association (ADA) guidelines.   Lipid Cascade   Result Value Ref Range    Cholesterol 170 <=199 mg/dL    Triglycerides 148 <=149 mg/dL    HDL Cholesterol 57 >=40 mg/dL    LDL Calculated 83 <=129 mg/dL    Patient Fasting > 8hrs? No    HM2(CBC w/o Differential)   Result Value Ref Range    WBC 8.6 4.0 - 11.0 thou/uL    RBC 5.14 4.40 - 6.20 mill/uL    Hemoglobin 14.7 14.0 - 18.0 g/dL    Hematocrit 45.1 40.0 - 54.0 %    MCV 88 80 - 100 fL    MCH 28.5 27.0 - 34.0 pg    MCHC 32.5 32.0 - 36.0 g/dL    RDW 12.7 11.0 - 14.5 %    Platelets 300 140 - 440 thou/uL    MPV 8.5 7.0 - 10.0 fL       All of your labs look great-Best of luck to you it has really been a pleasure knowing you and Lisa as a friend and patient over these many years    Please call with questions or contact us using  [employer].    Sincerely,        Electronically signed by Milan Whipple MD

## 2021-06-19 NOTE — LETTER
Letter by Milan Whipple MD at      Author: Milan Whipple MD Service: -- Author Type: --    Filed:  Encounter Date: 7/25/2019 Status: (Other)         Nadeem NIKOLAI Rosas  [address] [facility] [address]             July 25, 2019         Dear Mr. Rosas,    Below are the results from your recent visit:    No results found from the In Basket message.        Please call with questions or contact us using [employer]    Sincerely,        Electronically signed by Milan Whipple MD

## 2021-06-19 NOTE — LETTER
Letter by Milan Whipple MD at      Author: Milan Whipple MD Service: -- Author Type: --    Filed:  Encounter Date: 6/3/2019 Status: (Other)         Nadeem Rosas  [id number] [facility] [address]             Vaishali 3, 2019         Dear Mr. Rosas,    Below are the results from your recent visit:    Resulted Orders   PSA (Prostatic-Specific Antigen), Annual Screen   Result Value Ref Range    PSA 0.8 0.0 - 6.5 ng/mL    Narrative    Method is Abbott Prostate-Specific Antigen (PSA)  Standard-WHO 1st International (90:10)   HM2(CBC w/o Differential)   Result Value Ref Range    WBC 8.4 4.0 - 11.0 thou/uL    RBC 5.79 4.40 - 6.20 mill/uL    Hemoglobin 15.4 14.0 - 18.0 g/dL    Hematocrit 47.6 40.0 - 54.0 %    MCV 82 80 - 100 fL    MCH 26.5 (L) 27.0 - 34.0 pg    MCHC 32.3 32.0 - 36.0 g/dL    RDW 13.1 11.0 - 14.5 %    Platelets 275 140 - 440 thou/uL    MPV 9.1 7.0 - 10.0 fL   Comprehensive Metabolic Panel   Result Value Ref Range    Sodium 140 136 - 145 mmol/L    Potassium 4.2 3.5 - 5.0 mmol/L    Chloride 101 98 - 107 mmol/L    CO2 29 22 - 31 mmol/L    Anion Gap, Calculation 10 5 - 18 mmol/L    Glucose 98 70 - 125 mg/dL    BUN 17 8 - 28 mg/dL    Creatinine 0.85 0.70 - 1.30 mg/dL    GFR MDRD Af Amer >60 >60 mL/min/1.73m2    GFR MDRD Non Af Amer >60 >60 mL/min/1.73m2    Bilirubin, Total 0.4 0.0 - 1.0 mg/dL    Calcium 9.8 8.5 - 10.5 mg/dL    Protein, Total 7.3 6.0 - 8.0 g/dL    Albumin 4.4 3.5 - 5.0 g/dL    Alkaline Phosphatase 56 45 - 120 U/L    AST 19 0 - 40 U/L    ALT 21 0 - 45 U/L    Narrative    Fasting Glucose reference range is 70-99 mg/dL per  American Diabetes Association (ADA) guidelines.   Lipid Cascade   Result Value Ref Range    Cholesterol 191 <=199 mg/dL    Triglycerides 132 <=149 mg/dL    HDL Cholesterol 65 >=40 mg/dL    LDL Calculated 100 <=129 mg/dL    Patient Fasting > 8hrs? Yes        All labs looked great    Please call with questions or contact us using  [employer]    Sincerely,        Electronically signed by Milan Whipple MD

## 2022-01-01 ENCOUNTER — LAB REQUISITION (OUTPATIENT)
Dept: LAB | Facility: CLINIC | Age: 77
End: 2022-01-01

## 2022-01-01 LAB — PTH-INTACT SERPL-MCNC: 15 PG/ML (ref 15–65)

## 2022-01-01 PROCEDURE — 83970 ASSAY OF PARATHORMONE: CPT | Performed by: STUDENT IN AN ORGANIZED HEALTH CARE EDUCATION/TRAINING PROGRAM
